# Patient Record
Sex: MALE | Race: WHITE | Employment: OTHER | URBAN - METROPOLITAN AREA
[De-identification: names, ages, dates, MRNs, and addresses within clinical notes are randomized per-mention and may not be internally consistent; named-entity substitution may affect disease eponyms.]

---

## 2017-07-11 RX ORDER — GABAPENTIN 100 MG/1
100 CAPSULE ORAL 3 TIMES DAILY
COMMUNITY

## 2017-07-19 LAB
ABSOLUTE EOS #: 0.06 K/UL (ref 0–0.4)
ABSOLUTE LYMPH #: 0.69 K/UL (ref 1–4.8)
ABSOLUTE MONO #: 0.88 K/UL (ref 0.1–1.2)
ALBUMIN SERPL-MCNC: 3.6 G/DL (ref 3.5–5.2)
ALBUMIN/GLOBULIN RATIO: 1.2 (ref 1–2.5)
ALP BLD-CCNC: 55 U/L (ref 40–129)
ALT SERPL-CCNC: 22 U/L (ref 5–41)
ANION GAP SERPL CALCULATED.3IONS-SCNC: 14 MMOL/L (ref 9–17)
AST SERPL-CCNC: 30 U/L
BASOPHILS # BLD: 0 %
BASOPHILS ABSOLUTE: 0 K/UL (ref 0–0.2)
BILIRUB SERPL-MCNC: 0.43 MG/DL (ref 0.3–1.2)
BILIRUBIN DIRECT: 0.12 MG/DL
BILIRUBIN, INDIRECT: 0.31 MG/DL (ref 0–1)
BUN BLDV-MCNC: 19 MG/DL (ref 8–23)
BUN/CREAT BLD: 15 (ref 9–20)
CALCIUM SERPL-MCNC: 10.7 MG/DL (ref 8.6–10.4)
CHLORIDE BLD-SCNC: 101 MMOL/L (ref 98–107)
CO2: 29 MMOL/L (ref 20–31)
CREAT SERPL-MCNC: 1.26 MG/DL (ref 0.7–1.2)
DIFFERENTIAL TYPE: ABNORMAL
EOSINOPHILS RELATIVE PERCENT: 1 %
GFR AFRICAN AMERICAN: >60 ML/MIN
GFR NON-AFRICAN AMERICAN: 56 ML/MIN
GFR SERPL CREATININE-BSD FRML MDRD: ABNORMAL ML/MIN/{1.73_M2}
GFR SERPL CREATININE-BSD FRML MDRD: ABNORMAL ML/MIN/{1.73_M2}
GLOBULIN: 3 G/DL (ref 1.5–3.8)
GLUCOSE BLD-MCNC: 66 MG/DL (ref 70–99)
HCT VFR BLD CALC: 34.1 % (ref 41–53)
HEMOGLOBIN: 10.9 G/DL (ref 13.5–17.5)
LYMPHOCYTES # BLD: 11 %
MAGNESIUM: 1.8 MG/DL (ref 1.6–2.6)
MCH RBC QN AUTO: 25.9 PG (ref 26–34)
MCHC RBC AUTO-ENTMCNC: 32 G/DL (ref 31–37)
MCV RBC AUTO: 81.1 FL (ref 80–100)
MONOCYTES # BLD: 14 %
MORPHOLOGY: ABNORMAL
PDW BLD-RTO: 17.1 % (ref 11–14.5)
PLATELET # BLD: 155 K/UL (ref 140–450)
PLATELET ESTIMATE: ABNORMAL
PMV BLD AUTO: 8.9 FL (ref 6–12)
POTASSIUM SERPL-SCNC: 3.9 MMOL/L (ref 3.7–5.3)
RBC # BLD: 4.21 M/UL (ref 4.5–5.9)
RBC # BLD: ABNORMAL 10*6/UL
SEG NEUTROPHILS: 74 %
SEGMENTED NEUTROPHILS ABSOLUTE COUNT: 4.67 K/UL (ref 1.8–7.7)
SODIUM BLD-SCNC: 144 MMOL/L (ref 135–144)
TOTAL PROTEIN: 6.6 G/DL (ref 6.4–8.3)
WBC # BLD: 6.3 K/UL (ref 3.5–11)
WBC # BLD: ABNORMAL 10*3/UL

## 2017-07-20 LAB
VANCOMYCIN TROUGH DATE LAST DOSE: NORMAL
VANCOMYCIN TROUGH DOSE AMOUNT: NORMAL
VANCOMYCIN TROUGH TIME LAST DOSE: 800
VANCOMYCIN TROUGH: 12.5 UG/ML (ref 10–20)

## 2017-07-21 LAB
BUN BLDV-MCNC: 23 MG/DL (ref 8–23)
CREAT SERPL-MCNC: 1.37 MG/DL (ref 0.7–1.2)
GFR AFRICAN AMERICAN: >60 ML/MIN
GFR NON-AFRICAN AMERICAN: 51 ML/MIN
GFR SERPL CREATININE-BSD FRML MDRD: ABNORMAL ML/MIN/{1.73_M2}
GFR SERPL CREATININE-BSD FRML MDRD: ABNORMAL ML/MIN/{1.73_M2}
RAPAMUNE: 10.3 NG/ML

## 2017-07-24 LAB
BUN BLDV-MCNC: 23 MG/DL (ref 8–23)
CREAT SERPL-MCNC: 1.6 MG/DL (ref 0.7–1.2)
GFR AFRICAN AMERICAN: 52 ML/MIN
GFR NON-AFRICAN AMERICAN: 43 ML/MIN
GFR SERPL CREATININE-BSD FRML MDRD: ABNORMAL ML/MIN/{1.73_M2}
GFR SERPL CREATININE-BSD FRML MDRD: ABNORMAL ML/MIN/{1.73_M2}

## 2017-07-26 ENCOUNTER — HOSPITAL ENCOUNTER (OUTPATIENT)
Age: 72
Setting detail: SPECIMEN
Discharge: HOME | End: 2017-07-26
Payer: MEDICARE

## 2017-07-26 LAB
ABSOLUTE EOS #: 0.1 K/UL (ref 0–0.4)
ABSOLUTE LYMPH #: 0.6 K/UL (ref 1–4.8)
ABSOLUTE MONO #: 0.5 K/UL (ref 0.1–1.2)
ALBUMIN SERPL-MCNC: 3.4 G/DL (ref 3.5–5.2)
ALBUMIN/GLOBULIN RATIO: 1.3 (ref 1–2.5)
ALP BLD-CCNC: 58 U/L (ref 40–129)
ALT SERPL-CCNC: 21 U/L (ref 5–41)
ANION GAP SERPL CALCULATED.3IONS-SCNC: 11 MMOL/L (ref 9–17)
AST SERPL-CCNC: 23 U/L
BASOPHILS # BLD: 1 %
BASOPHILS ABSOLUTE: 0 K/UL (ref 0–0.2)
BILIRUB SERPL-MCNC: 0.31 MG/DL (ref 0.3–1.2)
BILIRUBIN DIRECT: 0.1 MG/DL
BILIRUBIN, INDIRECT: 0.21 MG/DL (ref 0–1)
BUN BLDV-MCNC: 17 MG/DL (ref 8–23)
BUN/CREAT BLD: 12 (ref 9–20)
CALCIUM SERPL-MCNC: 8.8 MG/DL (ref 8.6–10.4)
CHLORIDE BLD-SCNC: 102 MMOL/L (ref 98–107)
CO2: 25 MMOL/L (ref 20–31)
CREAT SERPL-MCNC: 1.38 MG/DL (ref 0.7–1.2)
DIFFERENTIAL TYPE: ABNORMAL
EOSINOPHILS RELATIVE PERCENT: 2 %
GFR AFRICAN AMERICAN: >60 ML/MIN
GFR NON-AFRICAN AMERICAN: 51 ML/MIN
GFR SERPL CREATININE-BSD FRML MDRD: ABNORMAL ML/MIN/{1.73_M2}
GFR SERPL CREATININE-BSD FRML MDRD: ABNORMAL ML/MIN/{1.73_M2}
GLOBULIN: 2.7 G/DL (ref 1.5–3.8)
GLUCOSE BLD-MCNC: 205 MG/DL (ref 70–99)
HCT VFR BLD CALC: 29.3 % (ref 41–53)
HEMOGLOBIN: 9.2 G/DL (ref 13.5–17.5)
LYMPHOCYTES # BLD: 19 %
MAGNESIUM: 2 MG/DL (ref 1.6–2.6)
MCH RBC QN AUTO: 25.5 PG (ref 26–34)
MCHC RBC AUTO-ENTMCNC: 31.4 G/DL (ref 31–37)
MCV RBC AUTO: 81.3 FL (ref 80–100)
MONOCYTES # BLD: 15 %
PDW BLD-RTO: 16.9 % (ref 11–14.5)
PLATELET # BLD: 170 K/UL (ref 140–450)
PLATELET ESTIMATE: ABNORMAL
PMV BLD AUTO: 9 FL (ref 6–12)
POTASSIUM SERPL-SCNC: 4.2 MMOL/L (ref 3.7–5.3)
RBC # BLD: 3.6 M/UL (ref 4.5–5.9)
RBC # BLD: ABNORMAL 10*6/UL
SEG NEUTROPHILS: 63 %
SEGMENTED NEUTROPHILS ABSOLUTE COUNT: 2 K/UL (ref 1.8–7.7)
SODIUM BLD-SCNC: 138 MMOL/L (ref 135–144)
TOTAL PROTEIN: 6.1 G/DL (ref 6.4–8.3)
WBC # BLD: 3.2 K/UL (ref 3.5–11)
WBC # BLD: ABNORMAL 10*3/UL

## 2017-07-27 LAB
VANCOMYCIN TROUGH DATE LAST DOSE: NORMAL
VANCOMYCIN TROUGH DOSE AMOUNT: NORMAL
VANCOMYCIN TROUGH TIME LAST DOSE: 800
VANCOMYCIN TROUGH: 16.2 UG/ML (ref 10–20)

## 2017-07-28 LAB
ABSOLUTE EOS #: 0 K/UL (ref 0–0.4)
ABSOLUTE LYMPH #: 0.4 K/UL (ref 1–4.8)
ABSOLUTE MONO #: 0.2 K/UL (ref 0.1–1.2)
BASOPHILS # BLD: 1 %
BASOPHILS ABSOLUTE: 0 K/UL (ref 0–0.2)
BUN BLDV-MCNC: 17 MG/DL (ref 8–23)
CREAT SERPL-MCNC: 1.29 MG/DL (ref 0.7–1.2)
DIFFERENTIAL TYPE: ABNORMAL
EOSINOPHILS RELATIVE PERCENT: 1 %
GFR AFRICAN AMERICAN: >60 ML/MIN
GFR NON-AFRICAN AMERICAN: 55 ML/MIN
GFR SERPL CREATININE-BSD FRML MDRD: ABNORMAL ML/MIN/{1.73_M2}
GFR SERPL CREATININE-BSD FRML MDRD: ABNORMAL ML/MIN/{1.73_M2}
HCT VFR BLD CALC: 40.4 % (ref 41–53)
HEMOGLOBIN: 12.8 G/DL (ref 13.5–17.5)
LYMPHOCYTES # BLD: 14 %
MCH RBC QN AUTO: 25.6 PG (ref 26–34)
MCHC RBC AUTO-ENTMCNC: 31.6 G/DL (ref 31–37)
MCV RBC AUTO: 81.2 FL (ref 80–100)
MONOCYTES # BLD: 8 %
PDW BLD-RTO: 17.2 % (ref 11–14.5)
PLATELET # BLD: 118 K/UL (ref 140–450)
PLATELET ESTIMATE: ABNORMAL
PMV BLD AUTO: 8.4 FL (ref 6–12)
RAPAMUNE: 14.9 NG/ML
RBC # BLD: 4.98 M/UL (ref 4.5–5.9)
RBC # BLD: ABNORMAL 10*6/UL
SEG NEUTROPHILS: 76 %
SEGMENTED NEUTROPHILS ABSOLUTE COUNT: 1.9 K/UL (ref 1.8–7.7)
WBC # BLD: 2.5 K/UL (ref 3.5–11)
WBC # BLD: ABNORMAL 10*3/UL

## 2017-07-31 LAB
ABSOLUTE EOS #: 0.06 K/UL (ref 0–0.4)
ABSOLUTE LYMPH #: 0.74 K/UL (ref 1–4.8)
ABSOLUTE MONO #: 0.5 K/UL (ref 0.1–1.2)
ALBUMIN SERPL-MCNC: 3.7 G/DL (ref 3.5–5.2)
ALBUMIN/GLOBULIN RATIO: 1.2 (ref 1–2.5)
ALP BLD-CCNC: 58 U/L (ref 40–129)
ALT SERPL-CCNC: 23 U/L (ref 5–41)
ANION GAP SERPL CALCULATED.3IONS-SCNC: 13 MMOL/L (ref 9–17)
AST SERPL-CCNC: 26 U/L
BASOPHILS # BLD: 1 %
BASOPHILS ABSOLUTE: 0.03 K/UL (ref 0–0.2)
BILIRUB SERPL-MCNC: 0.29 MG/DL (ref 0.3–1.2)
BILIRUBIN DIRECT: 0.09 MG/DL
BILIRUBIN, INDIRECT: 0.2 MG/DL (ref 0–1)
BUN BLDV-MCNC: 18 MG/DL (ref 8–23)
BUN/CREAT BLD: 13 (ref 9–20)
CALCIUM SERPL-MCNC: 9.2 MG/DL (ref 8.6–10.4)
CHLORIDE BLD-SCNC: 105 MMOL/L (ref 98–107)
CO2: 26 MMOL/L (ref 20–31)
CREAT SERPL-MCNC: 1.37 MG/DL (ref 0.7–1.2)
DIFFERENTIAL TYPE: ABNORMAL
EOSINOPHILS RELATIVE PERCENT: 2 %
GFR AFRICAN AMERICAN: >60 ML/MIN
GFR NON-AFRICAN AMERICAN: 51 ML/MIN
GFR SERPL CREATININE-BSD FRML MDRD: ABNORMAL ML/MIN/{1.73_M2}
GFR SERPL CREATININE-BSD FRML MDRD: ABNORMAL ML/MIN/{1.73_M2}
GLOBULIN: 3 G/DL (ref 1.5–3.8)
GLUCOSE BLD-MCNC: 52 MG/DL (ref 70–99)
HCT VFR BLD CALC: 30.6 % (ref 41–53)
HEMOGLOBIN: 9.7 G/DL (ref 13.5–17.5)
LYMPHOCYTES # BLD: 24 %
MAGNESIUM: 2 MG/DL (ref 1.6–2.6)
MCH RBC QN AUTO: 25.6 PG (ref 26–34)
MCHC RBC AUTO-ENTMCNC: 31.7 G/DL (ref 31–37)
MCV RBC AUTO: 80.9 FL (ref 80–100)
MONOCYTES # BLD: 16 %
MORPHOLOGY: ABNORMAL
PDW BLD-RTO: 16.8 % (ref 11–14.5)
PLATELET # BLD: 182 K/UL (ref 140–450)
PLATELET ESTIMATE: ABNORMAL
PMV BLD AUTO: 8.8 FL (ref 6–12)
POTASSIUM SERPL-SCNC: 3.9 MMOL/L (ref 3.7–5.3)
RBC # BLD: 3.78 M/UL (ref 4.5–5.9)
RBC # BLD: ABNORMAL 10*6/UL
SEG NEUTROPHILS: 57 %
SEGMENTED NEUTROPHILS ABSOLUTE COUNT: 1.77 K/UL (ref 1.8–7.7)
SODIUM BLD-SCNC: 144 MMOL/L (ref 135–144)
TOTAL CK: 192 U/L (ref 39–308)
TOTAL PROTEIN: 6.7 G/DL (ref 6.4–8.3)
WBC # BLD: 3.1 K/UL (ref 3.5–11)
WBC # BLD: ABNORMAL 10*3/UL

## 2017-08-02 LAB
ABSOLUTE EOS #: 0.1 K/UL (ref 0–0.4)
ABSOLUTE LYMPH #: 0.6 K/UL (ref 1–4.8)
ABSOLUTE MONO #: 0.6 K/UL (ref 0.1–1.2)
BASOPHILS # BLD: 1 %
BASOPHILS ABSOLUTE: 0 K/UL (ref 0–0.2)
DIFFERENTIAL TYPE: ABNORMAL
EOSINOPHILS RELATIVE PERCENT: 2 %
HCT VFR BLD CALC: 29.4 % (ref 41–53)
HEMOGLOBIN: 9.4 G/DL (ref 13.5–17.5)
LYMPHOCYTES # BLD: 17 %
MCH RBC QN AUTO: 25.9 PG (ref 26–34)
MCHC RBC AUTO-ENTMCNC: 32 G/DL (ref 31–37)
MCV RBC AUTO: 80.8 FL (ref 80–100)
MONOCYTES # BLD: 16 %
PDW BLD-RTO: 17.3 % (ref 11–14.5)
PLATELET # BLD: 185 K/UL (ref 140–450)
PLATELET ESTIMATE: ABNORMAL
PMV BLD AUTO: 8.9 FL (ref 6–12)
RBC # BLD: 3.64 M/UL (ref 4.5–5.9)
RBC # BLD: ABNORMAL 10*6/UL
SEG NEUTROPHILS: 64 %
SEGMENTED NEUTROPHILS ABSOLUTE COUNT: 2.3 K/UL (ref 1.8–7.7)
WBC # BLD: 3.6 K/UL (ref 3.5–11)
WBC # BLD: ABNORMAL 10*3/UL

## 2017-08-03 LAB — RAPAMUNE: 13 NG/ML

## 2017-08-04 LAB
ABSOLUTE EOS #: 0.1 K/UL (ref 0–0.4)
ABSOLUTE LYMPH #: 0.7 K/UL (ref 1–4.8)
ABSOLUTE MONO #: 0.5 K/UL (ref 0.1–1.2)
BASOPHILS # BLD: 1 %
BASOPHILS ABSOLUTE: 0 K/UL (ref 0–0.2)
DIFFERENTIAL TYPE: ABNORMAL
EOSINOPHILS RELATIVE PERCENT: 3 %
HCT VFR BLD CALC: 29.8 % (ref 41–53)
HEMOGLOBIN: 9.3 G/DL (ref 13.5–17.5)
LYMPHOCYTES # BLD: 21 %
MCH RBC QN AUTO: 25.1 PG (ref 26–34)
MCHC RBC AUTO-ENTMCNC: 31.1 G/DL (ref 31–37)
MCV RBC AUTO: 80.9 FL (ref 80–100)
MONOCYTES # BLD: 17 %
PDW BLD-RTO: 16.9 % (ref 11–14.5)
PLATELET # BLD: 160 K/UL (ref 140–450)
PLATELET ESTIMATE: ABNORMAL
PMV BLD AUTO: 8.8 FL (ref 6–12)
RBC # BLD: 3.69 M/UL (ref 4.5–5.9)
RBC # BLD: ABNORMAL 10*6/UL
SEG NEUTROPHILS: 58 %
SEGMENTED NEUTROPHILS ABSOLUTE COUNT: 1.8 K/UL (ref 1.8–7.7)
WBC # BLD: 3.1 K/UL (ref 3.5–11)
WBC # BLD: ABNORMAL 10*3/UL

## 2017-08-07 LAB
ABSOLUTE EOS #: 0.1 K/UL (ref 0–0.4)
ABSOLUTE LYMPH #: 0.5 K/UL (ref 1–4.8)
ABSOLUTE MONO #: 0.2 K/UL (ref 0.1–1.2)
ALBUMIN SERPL-MCNC: 3.9 G/DL (ref 3.5–5.2)
ALBUMIN/GLOBULIN RATIO: 1.4 (ref 1–2.5)
ALP BLD-CCNC: 55 U/L (ref 40–129)
ALT SERPL-CCNC: 24 U/L (ref 5–41)
ANION GAP SERPL CALCULATED.3IONS-SCNC: 12 MMOL/L (ref 9–17)
AST SERPL-CCNC: 33 U/L
BASOPHILS # BLD: 0 %
BASOPHILS ABSOLUTE: 0 K/UL (ref 0–0.2)
BILIRUB SERPL-MCNC: 0.26 MG/DL (ref 0.3–1.2)
BILIRUBIN DIRECT: 0.09 MG/DL
BILIRUBIN, INDIRECT: 0.17 MG/DL (ref 0–1)
BUN BLDV-MCNC: 20 MG/DL (ref 8–23)
BUN/CREAT BLD: 13 (ref 9–20)
CALCIUM SERPL-MCNC: 9.4 MG/DL (ref 8.6–10.4)
CHLORIDE BLD-SCNC: 102 MMOL/L (ref 98–107)
CO2: 27 MMOL/L (ref 20–31)
CREAT SERPL-MCNC: 1.59 MG/DL (ref 0.7–1.2)
DIFFERENTIAL TYPE: ABNORMAL
EOSINOPHILS RELATIVE PERCENT: 4 %
GFR AFRICAN AMERICAN: 52 ML/MIN
GFR NON-AFRICAN AMERICAN: 43 ML/MIN
GFR SERPL CREATININE-BSD FRML MDRD: ABNORMAL ML/MIN/{1.73_M2}
GFR SERPL CREATININE-BSD FRML MDRD: ABNORMAL ML/MIN/{1.73_M2}
GLOBULIN: 2.8 G/DL (ref 1.5–3.8)
GLUCOSE BLD-MCNC: 72 MG/DL (ref 70–99)
HCT VFR BLD CALC: 32.5 % (ref 41–53)
HEMOGLOBIN: 10.2 G/DL (ref 13.5–17.5)
LYMPHOCYTES # BLD: 14 %
MAGNESIUM: 2 MG/DL (ref 1.6–2.6)
MCH RBC QN AUTO: 25.1 PG (ref 26–34)
MCHC RBC AUTO-ENTMCNC: 31.3 G/DL (ref 31–37)
MCV RBC AUTO: 80.2 FL (ref 80–100)
MONOCYTES # BLD: 7 %
PDW BLD-RTO: 16.9 % (ref 11–14.5)
PLATELET # BLD: 171 K/UL (ref 140–450)
PLATELET ESTIMATE: ABNORMAL
PMV BLD AUTO: 8.8 FL (ref 6–12)
POTASSIUM SERPL-SCNC: 4.7 MMOL/L (ref 3.7–5.3)
RBC # BLD: 4.05 M/UL (ref 4.5–5.9)
RBC # BLD: ABNORMAL 10*6/UL
SEG NEUTROPHILS: 75 %
SEGMENTED NEUTROPHILS ABSOLUTE COUNT: 2.8 K/UL (ref 1.8–7.7)
SODIUM BLD-SCNC: 141 MMOL/L (ref 135–144)
TOTAL CK: 331 U/L (ref 39–308)
TOTAL PROTEIN: 6.7 G/DL (ref 6.4–8.3)
WBC # BLD: 3.7 K/UL (ref 3.5–11)
WBC # BLD: ABNORMAL 10*3/UL

## 2017-08-09 LAB
-: ABNORMAL
ABSOLUTE EOS #: 0.1 K/UL (ref 0–0.4)
ABSOLUTE LYMPH #: 0.7 K/UL (ref 1–4.8)
ABSOLUTE MONO #: 0.7 K/UL (ref 0.1–1.2)
ALBUMIN SERPL-MCNC: 3.6 G/DL (ref 3.5–5.2)
ALBUMIN/GLOBULIN RATIO: 1.2 (ref 1–2.5)
ALP BLD-CCNC: 66 U/L (ref 40–129)
ALT SERPL-CCNC: 24 U/L (ref 5–41)
AMORPHOUS: ABNORMAL
ANION GAP SERPL CALCULATED.3IONS-SCNC: 13 MMOL/L (ref 9–17)
AST SERPL-CCNC: 28 U/L
BACTERIA: ABNORMAL
BASOPHILS # BLD: 1 %
BASOPHILS ABSOLUTE: 0 K/UL (ref 0–0.2)
BILIRUB SERPL-MCNC: 0.19 MG/DL (ref 0.3–1.2)
BILIRUBIN DIRECT: 0.08 MG/DL
BILIRUBIN URINE: NEGATIVE
BUN BLDV-MCNC: 21 MG/DL (ref 8–23)
BUN/CREAT BLD: 13 (ref 9–20)
CALCIUM SERPL-MCNC: 9.2 MG/DL (ref 8.6–10.4)
CASTS UA: ABNORMAL /LPF (ref 0–2)
CHLORIDE BLD-SCNC: 100 MMOL/L (ref 98–107)
CO2: 24 MMOL/L (ref 20–31)
COLOR: ABNORMAL
COMMENT UA: ABNORMAL
CREAT SERPL-MCNC: 1.56 MG/DL (ref 0.7–1.2)
CREATININE URINE: 178.8 MG/DL (ref 39–259)
CRYSTALS, UA: ABNORMAL /HPF
DIFFERENTIAL TYPE: ABNORMAL
EOSINOPHILS RELATIVE PERCENT: 1 %
EPITHELIAL CELLS UA: ABNORMAL /HPF (ref 0–5)
GFR AFRICAN AMERICAN: 53 ML/MIN
GFR NON-AFRICAN AMERICAN: 44 ML/MIN
GFR SERPL CREATININE-BSD FRML MDRD: ABNORMAL ML/MIN/{1.73_M2}
GFR SERPL CREATININE-BSD FRML MDRD: ABNORMAL ML/MIN/{1.73_M2}
GLUCOSE BLD-MCNC: 346 MG/DL (ref 70–99)
GLUCOSE URINE: ABNORMAL
HCT VFR BLD CALC: 31.4 % (ref 41–53)
HEMOGLOBIN: 9.8 G/DL (ref 13.5–17.5)
KETONES, URINE: ABNORMAL
LEUKOCYTE ESTERASE, URINE: NEGATIVE
LYMPHOCYTES # BLD: 18 %
MAGNESIUM: 2 MG/DL (ref 1.6–2.6)
MCH RBC QN AUTO: 25.4 PG (ref 26–34)
MCHC RBC AUTO-ENTMCNC: 31.3 G/DL (ref 31–37)
MCV RBC AUTO: 81.2 FL (ref 80–100)
MONOCYTES # BLD: 16 %
MUCUS: ABNORMAL
NITRITE, URINE: NEGATIVE
OTHER OBSERVATIONS UA: ABNORMAL
PDW BLD-RTO: 16.8 % (ref 11–14.5)
PH UA: 6 (ref 5–6)
PHOSPHORUS: 3.2 MG/DL (ref 2.5–4.5)
PLATELET # BLD: 176 K/UL (ref 140–450)
PLATELET ESTIMATE: ABNORMAL
PMV BLD AUTO: 9.1 FL (ref 6–12)
POTASSIUM SERPL-SCNC: 5 MMOL/L (ref 3.7–5.3)
PROTEIN UA: ABNORMAL
RBC # BLD: 3.87 M/UL (ref 4.5–5.9)
RBC # BLD: ABNORMAL 10*6/UL
RBC UA: ABNORMAL /HPF (ref 0–4)
RENAL EPITHELIAL, UA: ABNORMAL /HPF
SEG NEUTROPHILS: 64 %
SEGMENTED NEUTROPHILS ABSOLUTE COUNT: 2.7 K/UL (ref 1.8–7.7)
SODIUM BLD-SCNC: 137 MMOL/L (ref 135–144)
SPECIFIC GRAVITY UA: 1.02 (ref 1.01–1.02)
TOTAL CK: 421 U/L (ref 39–308)
TOTAL PROTEIN, URINE: 52 MG/DL
TOTAL PROTEIN: 6.5 G/DL (ref 6.4–8.3)
TRICHOMONAS: ABNORMAL
TURBIDITY: ABNORMAL
URIC ACID: 4.9 MG/DL (ref 3.4–7)
URINE HGB: NEGATIVE
UROBILINOGEN, URINE: NORMAL
WBC # BLD: 4.2 K/UL (ref 3.5–11)
WBC # BLD: ABNORMAL 10*3/UL
WBC UA: ABNORMAL /HPF (ref 0–4)
YEAST: ABNORMAL

## 2017-08-10 LAB — RAPAMUNE: 15.2 NG/ML

## 2017-08-11 LAB
ABSOLUTE EOS #: 0.1 K/UL (ref 0–0.4)
ABSOLUTE LYMPH #: 0.7 K/UL (ref 1–4.8)
ABSOLUTE MONO #: 0.6 K/UL (ref 0.1–1.2)
BASOPHILS # BLD: 1 %
BASOPHILS ABSOLUTE: 0 K/UL (ref 0–0.2)
BUN BLDV-MCNC: 15 MG/DL (ref 8–23)
CREAT SERPL-MCNC: 1.25 MG/DL (ref 0.7–1.2)
DIFFERENTIAL TYPE: ABNORMAL
EOSINOPHILS RELATIVE PERCENT: 2 %
GFR AFRICAN AMERICAN: >60 ML/MIN
GFR NON-AFRICAN AMERICAN: 57 ML/MIN
GFR SERPL CREATININE-BSD FRML MDRD: ABNORMAL ML/MIN/{1.73_M2}
GFR SERPL CREATININE-BSD FRML MDRD: ABNORMAL ML/MIN/{1.73_M2}
HCT VFR BLD CALC: 30.2 % (ref 41–53)
HEMOGLOBIN: 9.5 G/DL (ref 13.5–17.5)
LYMPHOCYTES # BLD: 20 %
MCH RBC QN AUTO: 25.2 PG (ref 26–34)
MCHC RBC AUTO-ENTMCNC: 31.4 G/DL (ref 31–37)
MCV RBC AUTO: 80.2 FL (ref 80–100)
MONOCYTES # BLD: 17 %
PDW BLD-RTO: 16.5 % (ref 11–14.5)
PLATELET # BLD: 152 K/UL (ref 140–450)
PLATELET ESTIMATE: ABNORMAL
PMV BLD AUTO: 9.1 FL (ref 6–12)
RAPAMUNE: 13.6 NG/ML
RBC # BLD: 3.77 M/UL (ref 4.5–5.9)
RBC # BLD: ABNORMAL 10*6/UL
SEG NEUTROPHILS: 60 %
SEGMENTED NEUTROPHILS ABSOLUTE COUNT: 2 K/UL (ref 1.8–7.7)
TOTAL CK: 293 U/L (ref 39–308)
WBC # BLD: 3.4 K/UL (ref 3.5–11)
WBC # BLD: ABNORMAL 10*3/UL

## 2017-08-14 ENCOUNTER — HOSPITAL ENCOUNTER (OUTPATIENT)
Age: 72
Setting detail: SPECIMEN
Discharge: HOME | End: 2017-08-14
Payer: COMMERCIAL

## 2017-08-14 LAB
ABSOLUTE EOS #: 0.1 K/UL (ref 0–0.4)
ABSOLUTE LYMPH #: 0.8 K/UL (ref 1–4.8)
ABSOLUTE MONO #: 0.6 K/UL (ref 0.1–1.2)
ALBUMIN SERPL-MCNC: 3.6 G/DL (ref 3.5–5.2)
ALBUMIN/GLOBULIN RATIO: 1.4 (ref 1–2.5)
ALP BLD-CCNC: 56 U/L (ref 40–129)
ALT SERPL-CCNC: 28 U/L (ref 5–41)
ANION GAP SERPL CALCULATED.3IONS-SCNC: 11 MMOL/L (ref 9–17)
AST SERPL-CCNC: 27 U/L
BASOPHILS # BLD: 2 %
BASOPHILS ABSOLUTE: 0.1 K/UL (ref 0–0.2)
BILIRUB SERPL-MCNC: 0.2 MG/DL (ref 0.3–1.2)
BILIRUBIN DIRECT: 0.09 MG/DL
BUN BLDV-MCNC: 18 MG/DL (ref 8–23)
BUN/CREAT BLD: 13 (ref 9–20)
CALCIUM SERPL-MCNC: 9.3 MG/DL (ref 8.6–10.4)
CHLORIDE BLD-SCNC: 102 MMOL/L (ref 98–107)
CO2: 26 MMOL/L (ref 20–31)
CREAT SERPL-MCNC: 1.4 MG/DL (ref 0.7–1.2)
DIFFERENTIAL TYPE: ABNORMAL
EOSINOPHILS RELATIVE PERCENT: 3 %
GFR AFRICAN AMERICAN: >60 ML/MIN
GFR NON-AFRICAN AMERICAN: 50 ML/MIN
GFR SERPL CREATININE-BSD FRML MDRD: ABNORMAL ML/MIN/{1.73_M2}
GFR SERPL CREATININE-BSD FRML MDRD: ABNORMAL ML/MIN/{1.73_M2}
GLUCOSE BLD-MCNC: 155 MG/DL (ref 70–99)
HCT VFR BLD CALC: 30.2 % (ref 41–53)
HEMOGLOBIN: 9.5 G/DL (ref 13.5–17.5)
LYMPHOCYTES # BLD: 26 %
MAGNESIUM: 1.9 MG/DL (ref 1.6–2.6)
MCH RBC QN AUTO: 25 PG (ref 26–34)
MCHC RBC AUTO-ENTMCNC: 31.3 G/DL (ref 31–37)
MCV RBC AUTO: 79.8 FL (ref 80–100)
MONOCYTES # BLD: 20 %
PDW BLD-RTO: 16.7 % (ref 11–14.5)
PHOSPHORUS: 3.7 MG/DL (ref 2.5–4.5)
PLATELET # BLD: 152 K/UL (ref 140–450)
PLATELET ESTIMATE: ABNORMAL
PMV BLD AUTO: 8.6 FL (ref 6–12)
POTASSIUM SERPL-SCNC: 4.8 MMOL/L (ref 3.7–5.3)
RBC # BLD: 3.79 M/UL (ref 4.5–5.9)
RBC # BLD: ABNORMAL 10*6/UL
SEG NEUTROPHILS: 49 %
SEGMENTED NEUTROPHILS ABSOLUTE COUNT: 1.6 K/UL (ref 1.8–7.7)
SODIUM BLD-SCNC: 139 MMOL/L (ref 135–144)
TOTAL CK: 240 U/L (ref 39–308)
TOTAL PROTEIN: 6.1 G/DL (ref 6.4–8.3)
URIC ACID: 4.5 MG/DL (ref 3.4–7)
WBC # BLD: 3.2 K/UL (ref 3.5–11)
WBC # BLD: ABNORMAL 10*3/UL

## 2017-08-14 PROCEDURE — 85025 COMPLETE CBC W/AUTO DIFF WBC: CPT

## 2017-08-14 PROCEDURE — 80195 ASSAY OF SIROLIMUS: CPT

## 2017-08-14 PROCEDURE — 82550 ASSAY OF CK (CPK): CPT

## 2017-08-14 PROCEDURE — 82248 BILIRUBIN DIRECT: CPT

## 2017-08-14 PROCEDURE — 36415 COLL VENOUS BLD VENIPUNCTURE: CPT

## 2017-08-14 PROCEDURE — 80053 COMPREHEN METABOLIC PANEL: CPT

## 2017-08-14 PROCEDURE — 84100 ASSAY OF PHOSPHORUS: CPT

## 2017-08-14 PROCEDURE — 84550 ASSAY OF BLOOD/URIC ACID: CPT

## 2017-08-14 PROCEDURE — 83735 ASSAY OF MAGNESIUM: CPT

## 2017-08-16 ENCOUNTER — HOSPITAL ENCOUNTER (OUTPATIENT)
Age: 72
Setting detail: SPECIMEN
Discharge: HOME | End: 2017-08-16
Payer: MEDICARE

## 2017-08-16 LAB
ABSOLUTE EOS #: 0.1 K/UL (ref 0–0.4)
ABSOLUTE LYMPH #: 0.9 K/UL (ref 1–4.8)
ABSOLUTE MONO #: 0.6 K/UL (ref 0.1–1.2)
ALBUMIN SERPL-MCNC: 3.8 G/DL (ref 3.5–5.2)
ALBUMIN/GLOBULIN RATIO: 1.4 (ref 1–2.5)
ALP BLD-CCNC: 61 U/L (ref 40–129)
ALT SERPL-CCNC: 27 U/L (ref 5–41)
AST SERPL-CCNC: 28 U/L
BASOPHILS # BLD: 1 %
BASOPHILS ABSOLUTE: 0 K/UL (ref 0–0.2)
BILIRUB SERPL-MCNC: 0.19 MG/DL (ref 0.3–1.2)
BILIRUBIN DIRECT: 0.09 MG/DL
BILIRUBIN, INDIRECT: 0.1 MG/DL (ref 0–1)
BUN BLDV-MCNC: 17 MG/DL (ref 8–23)
CREAT SERPL-MCNC: 1.35 MG/DL (ref 0.7–1.2)
DIFFERENTIAL TYPE: ABNORMAL
EOSINOPHILS RELATIVE PERCENT: 2 %
GFR AFRICAN AMERICAN: >60 ML/MIN
GFR NON-AFRICAN AMERICAN: 52 ML/MIN
GFR SERPL CREATININE-BSD FRML MDRD: ABNORMAL ML/MIN/{1.73_M2}
GFR SERPL CREATININE-BSD FRML MDRD: ABNORMAL ML/MIN/{1.73_M2}
GLOBULIN: 2.8 G/DL (ref 1.5–3.8)
HCT VFR BLD CALC: 32.5 % (ref 41–53)
HEMOGLOBIN: 10.2 G/DL (ref 13.5–17.5)
LYMPHOCYTES # BLD: 21 %
MCH RBC QN AUTO: 24.9 PG (ref 26–34)
MCHC RBC AUTO-ENTMCNC: 31.3 G/DL (ref 31–37)
MCV RBC AUTO: 79.5 FL (ref 80–100)
MONOCYTES # BLD: 14 %
PDW BLD-RTO: 16.8 % (ref 11–14.5)
PLATELET # BLD: 169 K/UL (ref 140–450)
PLATELET ESTIMATE: ABNORMAL
PMV BLD AUTO: 8.8 FL (ref 6–12)
RBC # BLD: 4.1 M/UL (ref 4.5–5.9)
RBC # BLD: ABNORMAL 10*6/UL
SEG NEUTROPHILS: 62 %
SEGMENTED NEUTROPHILS ABSOLUTE COUNT: 2.9 K/UL (ref 1.8–7.7)
TOTAL CK: 277 U/L (ref 39–308)
TOTAL PROTEIN: 6.6 G/DL (ref 6.4–8.3)
WBC # BLD: 4.5 K/UL (ref 3.5–11)
WBC # BLD: ABNORMAL 10*3/UL

## 2017-08-16 PROCEDURE — 81001 URINALYSIS AUTO W/SCOPE: CPT

## 2017-08-16 PROCEDURE — 85025 COMPLETE CBC W/AUTO DIFF WBC: CPT

## 2017-08-16 PROCEDURE — 82565 ASSAY OF CREATININE: CPT

## 2017-08-16 PROCEDURE — 80076 HEPATIC FUNCTION PANEL: CPT

## 2017-08-16 PROCEDURE — 84520 ASSAY OF UREA NITROGEN: CPT

## 2017-08-16 PROCEDURE — 36415 COLL VENOUS BLD VENIPUNCTURE: CPT

## 2017-08-16 PROCEDURE — 82550 ASSAY OF CK (CPK): CPT

## 2017-08-17 ENCOUNTER — HOSPITAL ENCOUNTER (OUTPATIENT)
Age: 72
Discharge: HOME | End: 2017-08-17
Payer: MEDICARE

## 2017-08-17 LAB
-: ABNORMAL
AMORPHOUS: ABNORMAL
BACTERIA: ABNORMAL
BILIRUBIN URINE: NEGATIVE
CASTS UA: ABNORMAL /LPF (ref 0–2)
CHOLESTEROL, FASTING: 195 MG/DL
CHOLESTEROL/HDL RATIO: 4.1
COLOR: ABNORMAL
COMMENT UA: ABNORMAL
CRYSTALS, UA: ABNORMAL /HPF
EPITHELIAL CELLS UA: ABNORMAL /HPF (ref 0–5)
FERRITIN: 873 UG/L (ref 30–400)
GLUCOSE URINE: ABNORMAL
HDLC SERPL-MCNC: 47 MG/DL
IRON SATURATION: 20 % (ref 20–55)
IRON: 51 UG/DL (ref 59–158)
KETONES, URINE: NEGATIVE
LDL CHOLESTEROL: 113 MG/DL (ref 0–130)
LEUKOCYTE ESTERASE, URINE: NEGATIVE
MUCUS: ABNORMAL
NITRITE, URINE: NEGATIVE
OTHER OBSERVATIONS UA: ABNORMAL
PH UA: 7 (ref 5–6)
PROTEIN UA: ABNORMAL
RBC UA: ABNORMAL /HPF (ref 0–4)
RENAL EPITHELIAL, UA: ABNORMAL /HPF
SPECIFIC GRAVITY UA: 1.02 (ref 1.01–1.02)
TOTAL IRON BINDING CAPACITY: 252 UG/DL (ref 250–450)
TRICHOMONAS: ABNORMAL
TRIGLYCERIDE, FASTING: 173 MG/DL
TURBIDITY: ABNORMAL
UNSATURATED IRON BINDING CAPACITY: 201 UG/DL (ref 112–347)
URINE HGB: NEGATIVE
UROBILINOGEN, URINE: NORMAL
VLDLC SERPL CALC-MCNC: ABNORMAL MG/DL (ref 1–30)
WBC UA: ABNORMAL /HPF (ref 0–4)
YEAST: ABNORMAL

## 2017-08-17 PROCEDURE — 83550 IRON BINDING TEST: CPT

## 2017-08-17 PROCEDURE — 80061 LIPID PANEL: CPT

## 2017-08-17 PROCEDURE — 36415 COLL VENOUS BLD VENIPUNCTURE: CPT

## 2017-08-17 PROCEDURE — 82728 ASSAY OF FERRITIN: CPT

## 2017-08-17 PROCEDURE — 83540 ASSAY OF IRON: CPT

## 2017-08-18 ENCOUNTER — HOSPITAL ENCOUNTER (OUTPATIENT)
Age: 72
Setting detail: SPECIMEN
Discharge: HOME | End: 2017-08-18
Payer: COMMERCIAL

## 2017-08-18 LAB
ABSOLUTE EOS #: 0.1 K/UL (ref 0–0.4)
ABSOLUTE LYMPH #: 0.9 K/UL (ref 1–4.8)
ABSOLUTE MONO #: 0.7 K/UL (ref 0.1–1.2)
BASOPHILS # BLD: 1 %
BASOPHILS ABSOLUTE: 0 K/UL (ref 0–0.2)
DIFFERENTIAL TYPE: ABNORMAL
EOSINOPHILS RELATIVE PERCENT: 3 %
HCT VFR BLD CALC: 33 % (ref 41–53)
HEMOGLOBIN: 10.6 G/DL (ref 13.5–17.5)
LYMPHOCYTES # BLD: 16 %
MCH RBC QN AUTO: 25.6 PG (ref 26–34)
MCHC RBC AUTO-ENTMCNC: 31.9 G/DL (ref 31–37)
MCV RBC AUTO: 80.1 FL (ref 80–100)
MONOCYTES # BLD: 14 %
PDW BLD-RTO: 17 % (ref 11–14.5)
PLATELET # BLD: 198 K/UL (ref 140–450)
PLATELET ESTIMATE: ABNORMAL
PMV BLD AUTO: 9.2 FL (ref 6–12)
RAPAMUNE: 11.5 NG/ML
RBC # BLD: 4.13 M/UL (ref 4.5–5.9)
RBC # BLD: ABNORMAL 10*6/UL
SEG NEUTROPHILS: 66 %
SEGMENTED NEUTROPHILS ABSOLUTE COUNT: 3.7 K/UL (ref 1.8–7.7)
WBC # BLD: 5.5 K/UL (ref 3.5–11)
WBC # BLD: ABNORMAL 10*3/UL

## 2017-08-18 PROCEDURE — 85025 COMPLETE CBC W/AUTO DIFF WBC: CPT

## 2017-08-18 PROCEDURE — 36415 COLL VENOUS BLD VENIPUNCTURE: CPT

## 2017-08-19 ENCOUNTER — HOSPITAL ENCOUNTER (OUTPATIENT)
Age: 72
Setting detail: SPECIMEN
Discharge: HOME | End: 2017-08-19
Payer: COMMERCIAL

## 2017-08-19 LAB
-: ABNORMAL
AMORPHOUS: ABNORMAL
BACTERIA: ABNORMAL
BILIRUBIN URINE: NEGATIVE
CASTS UA: ABNORMAL /LPF (ref 0–2)
COLOR: ABNORMAL
COMMENT UA: ABNORMAL
CRYSTALS, UA: ABNORMAL /HPF
EPITHELIAL CELLS UA: ABNORMAL /HPF (ref 0–5)
GLUCOSE URINE: ABNORMAL
KETONES, URINE: NEGATIVE
LEUKOCYTE ESTERASE, URINE: NEGATIVE
MUCUS: ABNORMAL
NITRITE, URINE: NEGATIVE
OTHER OBSERVATIONS UA: ABNORMAL
PH UA: 6.5 (ref 5–6)
PROTEIN UA: ABNORMAL
RBC UA: ABNORMAL /HPF (ref 0–4)
RENAL EPITHELIAL, UA: ABNORMAL /HPF
SPECIFIC GRAVITY UA: 1.02 (ref 1.01–1.02)
TRICHOMONAS: ABNORMAL
TURBIDITY: ABNORMAL
URINE HGB: NEGATIVE
UROBILINOGEN, URINE: NORMAL
WBC UA: ABNORMAL /HPF (ref 0–4)
YEAST: ABNORMAL

## 2017-08-19 PROCEDURE — 81001 URINALYSIS AUTO W/SCOPE: CPT

## 2017-08-21 ENCOUNTER — HOSPITAL ENCOUNTER (OUTPATIENT)
Age: 72
Setting detail: SPECIMEN
Discharge: HOME | End: 2017-08-21
Payer: MEDICARE

## 2017-08-21 LAB
ABSOLUTE EOS #: 0.15 K/UL (ref 0–0.4)
ABSOLUTE LYMPH #: 0.68 K/UL (ref 1–4.8)
ABSOLUTE MONO #: 0.61 K/UL (ref 0.1–1.2)
ALBUMIN SERPL-MCNC: 3.7 G/DL (ref 3.5–5.2)
ALBUMIN/GLOBULIN RATIO: 1.3 (ref 1–2.5)
ALP BLD-CCNC: 54 U/L (ref 40–129)
ALT SERPL-CCNC: 26 U/L (ref 5–41)
ANION GAP SERPL CALCULATED.3IONS-SCNC: 10 MMOL/L (ref 9–17)
AST SERPL-CCNC: 32 U/L
BASOPHILS # BLD: 0 %
BASOPHILS ABSOLUTE: 0 K/UL (ref 0–0.2)
BILIRUB SERPL-MCNC: 0.23 MG/DL (ref 0.3–1.2)
BILIRUBIN DIRECT: <0.08 MG/DL
BILIRUBIN, INDIRECT: ABNORMAL MG/DL (ref 0–1)
BUN BLDV-MCNC: 16 MG/DL (ref 8–23)
BUN/CREAT BLD: 13 (ref 9–20)
CALCIUM SERPL-MCNC: 9.3 MG/DL (ref 8.6–10.4)
CHLORIDE BLD-SCNC: 102 MMOL/L (ref 98–107)
CO2: 27 MMOL/L (ref 20–31)
CREAT SERPL-MCNC: 1.25 MG/DL (ref 0.7–1.2)
DIFFERENTIAL TYPE: ABNORMAL
EOSINOPHILS RELATIVE PERCENT: 4 %
GFR AFRICAN AMERICAN: >60 ML/MIN
GFR NON-AFRICAN AMERICAN: 57 ML/MIN
GFR SERPL CREATININE-BSD FRML MDRD: ABNORMAL ML/MIN/{1.73_M2}
GFR SERPL CREATININE-BSD FRML MDRD: ABNORMAL ML/MIN/{1.73_M2}
GLOBULIN: 2.8 G/DL (ref 1.5–3.8)
GLUCOSE BLD-MCNC: 172 MG/DL (ref 70–99)
HCT VFR BLD CALC: 31.6 % (ref 41–53)
HEMOGLOBIN: 10 G/DL (ref 13.5–17.5)
LYMPHOCYTES # BLD: 18 %
MAGNESIUM: 1.9 MG/DL (ref 1.6–2.6)
MCH RBC QN AUTO: 25.2 PG (ref 26–34)
MCHC RBC AUTO-ENTMCNC: 31.7 G/DL (ref 31–37)
MCV RBC AUTO: 79.4 FL (ref 80–100)
MONOCYTES # BLD: 16 %
MORPHOLOGY: ABNORMAL
PDW BLD-RTO: 17.1 % (ref 11–14.5)
PLATELET # BLD: 155 K/UL (ref 140–450)
PLATELET ESTIMATE: ABNORMAL
PMV BLD AUTO: 8.7 FL (ref 6–12)
POTASSIUM SERPL-SCNC: 4.6 MMOL/L (ref 3.7–5.3)
RBC # BLD: 3.98 M/UL (ref 4.5–5.9)
RBC # BLD: ABNORMAL 10*6/UL
SEG NEUTROPHILS: 62 %
SEGMENTED NEUTROPHILS ABSOLUTE COUNT: 2.36 K/UL (ref 1.8–7.7)
SODIUM BLD-SCNC: 139 MMOL/L (ref 135–144)
TOTAL CK: 259 U/L (ref 39–308)
TOTAL PROTEIN: 6.5 G/DL (ref 6.4–8.3)
WBC # BLD: 3.8 K/UL (ref 3.5–11)
WBC # BLD: ABNORMAL 10*3/UL

## 2017-08-21 PROCEDURE — 83735 ASSAY OF MAGNESIUM: CPT

## 2017-08-21 PROCEDURE — 80048 BASIC METABOLIC PNL TOTAL CA: CPT

## 2017-08-21 PROCEDURE — 80195 ASSAY OF SIROLIMUS: CPT

## 2017-08-21 PROCEDURE — 82550 ASSAY OF CK (CPK): CPT

## 2017-08-21 PROCEDURE — 36415 COLL VENOUS BLD VENIPUNCTURE: CPT

## 2017-08-21 PROCEDURE — 80076 HEPATIC FUNCTION PANEL: CPT

## 2017-08-21 PROCEDURE — 85025 COMPLETE CBC W/AUTO DIFF WBC: CPT

## 2017-08-22 LAB — RAPAMUNE: 14.6 NG/ML

## 2017-08-23 ENCOUNTER — HOSPITAL ENCOUNTER (OUTPATIENT)
Age: 72
Setting detail: SPECIMEN
Discharge: HOME | End: 2017-08-23
Payer: COMMERCIAL

## 2017-08-23 LAB
ABSOLUTE EOS #: 0.1 K/UL (ref 0–0.4)
ABSOLUTE LYMPH #: 0.8 K/UL (ref 1–4.8)
ABSOLUTE MONO #: 0.7 K/UL (ref 0.1–1.2)
BASOPHILS # BLD: 1 %
BASOPHILS ABSOLUTE: 0 K/UL (ref 0–0.2)
DIFFERENTIAL TYPE: ABNORMAL
EOSINOPHILS RELATIVE PERCENT: 2 %
HCT VFR BLD CALC: 31.9 % (ref 41–53)
HEMOGLOBIN: 10.1 G/DL (ref 13.5–17.5)
LYMPHOCYTES # BLD: 17 %
MCH RBC QN AUTO: 25.1 PG (ref 26–34)
MCHC RBC AUTO-ENTMCNC: 31.5 G/DL (ref 31–37)
MCV RBC AUTO: 79.6 FL (ref 80–100)
MONOCYTES # BLD: 15 %
PDW BLD-RTO: 16.9 % (ref 11–14.5)
PLATELET # BLD: 155 K/UL (ref 140–450)
PLATELET ESTIMATE: ABNORMAL
PMV BLD AUTO: 8.6 FL (ref 6–12)
RBC # BLD: 4.01 M/UL (ref 4.5–5.9)
RBC # BLD: ABNORMAL 10*6/UL
SEG NEUTROPHILS: 65 %
SEGMENTED NEUTROPHILS ABSOLUTE COUNT: 3 K/UL (ref 1.8–7.7)
WBC # BLD: 4.5 K/UL (ref 3.5–11)
WBC # BLD: ABNORMAL 10*3/UL

## 2017-08-23 PROCEDURE — 85025 COMPLETE CBC W/AUTO DIFF WBC: CPT

## 2017-08-23 PROCEDURE — 36415 COLL VENOUS BLD VENIPUNCTURE: CPT

## 2017-08-25 ENCOUNTER — HOSPITAL ENCOUNTER (OUTPATIENT)
Age: 72
Setting detail: SPECIMEN
Discharge: HOME | End: 2017-08-25
Payer: MEDICARE

## 2017-08-25 LAB
ABSOLUTE EOS #: 0.1 K/UL (ref 0–0.4)
ABSOLUTE LYMPH #: 0.8 K/UL (ref 1–4.8)
ABSOLUTE MONO #: 0.7 K/UL (ref 0.1–1.2)
BASOPHILS # BLD: 2 %
BASOPHILS ABSOLUTE: 0.1 K/UL (ref 0–0.2)
DIFFERENTIAL TYPE: ABNORMAL
EOSINOPHILS RELATIVE PERCENT: 2 %
HCT VFR BLD CALC: 30.8 % (ref 41–53)
HEMOGLOBIN: 9.8 G/DL (ref 13.5–17.5)
LYMPHOCYTES # BLD: 21 %
MCH RBC QN AUTO: 25 PG (ref 26–34)
MCHC RBC AUTO-ENTMCNC: 31.7 G/DL (ref 31–37)
MCV RBC AUTO: 79 FL (ref 80–100)
MONOCYTES # BLD: 18 %
PDW BLD-RTO: 17.2 % (ref 11–14.5)
PLATELET # BLD: 147 K/UL (ref 140–450)
PLATELET ESTIMATE: ABNORMAL
PMV BLD AUTO: 8.7 FL (ref 6–12)
RBC # BLD: 3.9 M/UL (ref 4.5–5.9)
RBC # BLD: ABNORMAL 10*6/UL
SEG NEUTROPHILS: 57 %
SEGMENTED NEUTROPHILS ABSOLUTE COUNT: 2.1 K/UL (ref 1.8–7.7)
WBC # BLD: 3.6 K/UL (ref 3.5–11)
WBC # BLD: ABNORMAL 10*3/UL

## 2017-08-25 PROCEDURE — 36415 COLL VENOUS BLD VENIPUNCTURE: CPT

## 2017-08-25 PROCEDURE — 85025 COMPLETE CBC W/AUTO DIFF WBC: CPT

## 2017-08-25 PROCEDURE — 80195 ASSAY OF SIROLIMUS: CPT

## 2017-08-27 LAB — RAPAMUNE: 12.9 NG/ML

## 2017-08-28 ENCOUNTER — HOSPITAL ENCOUNTER (OUTPATIENT)
Age: 72
Setting detail: SPECIMEN
Discharge: HOME | End: 2017-08-28
Payer: MEDICARE

## 2017-08-28 LAB
ABSOLUTE EOS #: 0.1 K/UL (ref 0–0.4)
ABSOLUTE LYMPH #: 0.7 K/UL (ref 1–4.8)
ABSOLUTE MONO #: 0.7 K/UL (ref 0.1–1.2)
ALBUMIN SERPL-MCNC: 3.6 G/DL (ref 3.5–5.2)
ALBUMIN/GLOBULIN RATIO: 1.3 (ref 1–2.5)
ALP BLD-CCNC: 47 U/L (ref 40–129)
ALT SERPL-CCNC: 23 U/L (ref 5–41)
ANION GAP SERPL CALCULATED.3IONS-SCNC: 14 MMOL/L (ref 9–17)
AST SERPL-CCNC: 27 U/L
BASOPHILS # BLD: 1 %
BASOPHILS ABSOLUTE: 0 K/UL (ref 0–0.2)
BILIRUB SERPL-MCNC: 0.27 MG/DL (ref 0.3–1.2)
BILIRUBIN DIRECT: 0.11 MG/DL
BILIRUBIN, INDIRECT: 0.16 MG/DL (ref 0–1)
BUN BLDV-MCNC: 19 MG/DL (ref 8–23)
BUN/CREAT BLD: 15 (ref 9–20)
CALCIUM SERPL-MCNC: 9.2 MG/DL (ref 8.6–10.4)
CHLORIDE BLD-SCNC: 100 MMOL/L (ref 98–107)
CO2: 24 MMOL/L (ref 20–31)
CREAT SERPL-MCNC: 1.23 MG/DL (ref 0.7–1.2)
DIFFERENTIAL TYPE: ABNORMAL
EOSINOPHILS RELATIVE PERCENT: 1 %
GFR AFRICAN AMERICAN: >60 ML/MIN
GFR NON-AFRICAN AMERICAN: 58 ML/MIN
GFR SERPL CREATININE-BSD FRML MDRD: ABNORMAL ML/MIN/{1.73_M2}
GFR SERPL CREATININE-BSD FRML MDRD: ABNORMAL ML/MIN/{1.73_M2}
GLOBULIN: 2.7 G/DL (ref 1.5–3.8)
GLUCOSE BLD-MCNC: 198 MG/DL (ref 70–99)
HCT VFR BLD CALC: 30.2 % (ref 41–53)
HEMOGLOBIN: 9.6 G/DL (ref 13.5–17.5)
LYMPHOCYTES # BLD: 17 %
MAGNESIUM: 1.9 MG/DL (ref 1.6–2.6)
MCH RBC QN AUTO: 25.2 PG (ref 26–34)
MCHC RBC AUTO-ENTMCNC: 31.9 G/DL (ref 31–37)
MCV RBC AUTO: 78.9 FL (ref 80–100)
MONOCYTES # BLD: 17 %
PDW BLD-RTO: 17 % (ref 11–14.5)
PLATELET # BLD: 149 K/UL (ref 140–450)
PLATELET ESTIMATE: ABNORMAL
PMV BLD AUTO: 8.8 FL (ref 6–12)
POTASSIUM SERPL-SCNC: 4.8 MMOL/L (ref 3.7–5.3)
RBC # BLD: 3.82 M/UL (ref 4.5–5.9)
RBC # BLD: ABNORMAL 10*6/UL
SEG NEUTROPHILS: 64 %
SEGMENTED NEUTROPHILS ABSOLUTE COUNT: 2.7 K/UL (ref 1.8–7.7)
SODIUM BLD-SCNC: 138 MMOL/L (ref 135–144)
TOTAL CK: 221 U/L (ref 39–308)
TOTAL PROTEIN: 6.3 G/DL (ref 6.4–8.3)
WBC # BLD: 4.2 K/UL (ref 3.5–11)
WBC # BLD: ABNORMAL 10*3/UL

## 2017-08-28 PROCEDURE — 80048 BASIC METABOLIC PNL TOTAL CA: CPT

## 2017-08-28 PROCEDURE — 83735 ASSAY OF MAGNESIUM: CPT

## 2017-08-28 PROCEDURE — 82550 ASSAY OF CK (CPK): CPT

## 2017-08-28 PROCEDURE — 36415 COLL VENOUS BLD VENIPUNCTURE: CPT

## 2017-08-28 PROCEDURE — 80195 ASSAY OF SIROLIMUS: CPT

## 2017-08-28 PROCEDURE — 80076 HEPATIC FUNCTION PANEL: CPT

## 2017-08-28 PROCEDURE — 85025 COMPLETE CBC W/AUTO DIFF WBC: CPT

## 2017-08-30 ENCOUNTER — HOSPITAL ENCOUNTER (OUTPATIENT)
Age: 72
Setting detail: SPECIMEN
Discharge: HOME | End: 2017-08-30
Payer: MEDICARE

## 2017-08-30 LAB
ABSOLUTE EOS #: 0.1 K/UL (ref 0–0.4)
ABSOLUTE LYMPH #: 0.7 K/UL (ref 1–4.8)
ABSOLUTE MONO #: 0.6 K/UL (ref 0.1–1.2)
BASOPHILS # BLD: 1 %
BASOPHILS ABSOLUTE: 0 K/UL (ref 0–0.2)
DIFFERENTIAL TYPE: ABNORMAL
EOSINOPHILS RELATIVE PERCENT: 2 %
HCT VFR BLD CALC: 30.8 % (ref 41–53)
HEMOGLOBIN: 9.7 G/DL (ref 13.5–17.5)
LYMPHOCYTES # BLD: 20 %
MCH RBC QN AUTO: 24.8 PG (ref 26–34)
MCHC RBC AUTO-ENTMCNC: 31.5 G/DL (ref 31–37)
MCV RBC AUTO: 78.8 FL (ref 80–100)
MONOCYTES # BLD: 19 %
PDW BLD-RTO: 17.2 % (ref 11–14.5)
PLATELET # BLD: 153 K/UL (ref 140–450)
PLATELET ESTIMATE: ABNORMAL
PMV BLD AUTO: 8.7 FL (ref 6–12)
RAPAMUNE: 8 NG/ML
RBC # BLD: 3.91 M/UL (ref 4.5–5.9)
RBC # BLD: ABNORMAL 10*6/UL
SEG NEUTROPHILS: 58 %
SEGMENTED NEUTROPHILS ABSOLUTE COUNT: 2 K/UL (ref 1.8–7.7)
WBC # BLD: 3.4 K/UL (ref 3.5–11)
WBC # BLD: ABNORMAL 10*3/UL

## 2017-08-30 PROCEDURE — 36415 COLL VENOUS BLD VENIPUNCTURE: CPT

## 2017-08-30 PROCEDURE — 85025 COMPLETE CBC W/AUTO DIFF WBC: CPT

## 2017-10-30 ENCOUNTER — OFFICE VISIT (OUTPATIENT)
Dept: UROLOGY | Age: 72
End: 2017-10-30
Payer: MEDICARE

## 2017-10-30 ENCOUNTER — HOSPITAL ENCOUNTER (OUTPATIENT)
Age: 72
Setting detail: SPECIMEN
Discharge: HOME | End: 2017-10-30
Payer: MEDICARE

## 2017-10-30 VITALS
SYSTOLIC BLOOD PRESSURE: 130 MMHG | BODY MASS INDEX: 36.96 KG/M2 | HEART RATE: 88 BPM | WEIGHT: 272.9 LBS | DIASTOLIC BLOOD PRESSURE: 68 MMHG | HEIGHT: 72 IN

## 2017-10-30 DIAGNOSIS — N40.1 BENIGN PROSTATIC HYPERPLASIA WITH LOWER URINARY TRACT SYMPTOMS, SYMPTOM DETAILS UNSPECIFIED: Primary | ICD-10-CM

## 2017-10-30 DIAGNOSIS — N40.1 BENIGN PROSTATIC HYPERPLASIA WITH LOWER URINARY TRACT SYMPTOMS, SYMPTOM DETAILS UNSPECIFIED: ICD-10-CM

## 2017-10-30 DIAGNOSIS — N18.6 ESRD (END STAGE RENAL DISEASE) (HCC): ICD-10-CM

## 2017-10-30 LAB
-: ABNORMAL
AMORPHOUS: ABNORMAL
BACTERIA: ABNORMAL
BILIRUBIN URINE: NEGATIVE
CASTS UA: ABNORMAL /LPF (ref 0–2)
COLOR: ABNORMAL
COMMENT UA: ABNORMAL
CRYSTALS, UA: ABNORMAL /HPF
EPITHELIAL CELLS UA: ABNORMAL /HPF (ref 0–5)
GLUCOSE URINE: NEGATIVE
KETONES, URINE: NEGATIVE
LEUKOCYTE ESTERASE, URINE: ABNORMAL
MUCUS: ABNORMAL
NITRITE, URINE: NEGATIVE
OTHER OBSERVATIONS UA: ABNORMAL
PH UA: 6 (ref 5–6)
POST VOID RESIDUAL (PVR): NORMAL ML
PROTEIN UA: ABNORMAL
RBC UA: ABNORMAL /HPF (ref 0–4)
RENAL EPITHELIAL, UA: ABNORMAL /HPF
SPECIFIC GRAVITY UA: 1.01 (ref 1.01–1.02)
TRICHOMONAS: ABNORMAL
TURBIDITY: ABNORMAL
URINE HGB: ABNORMAL
UROBILINOGEN, URINE: NORMAL
WBC UA: >100 /HPF (ref 0–4)
YEAST: ABNORMAL

## 2017-10-30 PROCEDURE — 81001 URINALYSIS AUTO W/SCOPE: CPT

## 2017-10-30 PROCEDURE — G8417 CALC BMI ABV UP PARAM F/U: HCPCS | Performed by: UROLOGY

## 2017-10-30 PROCEDURE — 1036F TOBACCO NON-USER: CPT | Performed by: UROLOGY

## 2017-10-30 PROCEDURE — 99204 OFFICE O/P NEW MOD 45 MIN: CPT | Performed by: UROLOGY

## 2017-10-30 PROCEDURE — G8484 FLU IMMUNIZE NO ADMIN: HCPCS | Performed by: UROLOGY

## 2017-10-30 PROCEDURE — 87184 SC STD DISK METHOD PER PLATE: CPT

## 2017-10-30 PROCEDURE — 4040F PNEUMOC VAC/ADMIN/RCVD: CPT | Performed by: UROLOGY

## 2017-10-30 PROCEDURE — 87186 SC STD MICRODIL/AGAR DIL: CPT

## 2017-10-30 PROCEDURE — 51798 US URINE CAPACITY MEASURE: CPT | Performed by: UROLOGY

## 2017-10-30 PROCEDURE — 3017F COLORECTAL CA SCREEN DOC REV: CPT | Performed by: UROLOGY

## 2017-10-30 PROCEDURE — 87086 URINE CULTURE/COLONY COUNT: CPT

## 2017-10-30 PROCEDURE — 87077 CULTURE AEROBIC IDENTIFY: CPT

## 2017-10-30 PROCEDURE — G8427 DOCREV CUR MEDS BY ELIG CLIN: HCPCS | Performed by: UROLOGY

## 2017-10-30 PROCEDURE — 1123F ACP DISCUSS/DSCN MKR DOCD: CPT | Performed by: UROLOGY

## 2017-10-30 RX ORDER — TAMSULOSIN HYDROCHLORIDE 0.4 MG/1
0.4 CAPSULE ORAL DAILY
COMMUNITY

## 2017-10-30 RX ORDER — DOXYCYCLINE HYCLATE 100 MG/1
100 CAPSULE ORAL 2 TIMES DAILY
COMMUNITY

## 2017-10-30 NOTE — PATIENT INSTRUCTIONS
Patient Education        Cystoscopy: Care Instructions  Your Care Instructions  Cystoscopy is a test. It uses a thin, lighted tube called a cystoscope to see the inside of the bladder and the urethra. The urethra is the tube that carries urine out of the body. This test is helpful because it lets your doctor see areas of your bladder and urethra that are hard to see on X-rays. It can help your doctor find bladder stones, tumors, bleeding, and infection. During this test, your doctor also can take tissue and urine samples. And if your doctor finds small stones or growths, he or she can remove them. In most cases the scope is in the bladder for less than 10 minutes. But the entire test may take 45 minutes or longer. You will probably get local anesthesia. This numbs a small part of your body. Or you may get spinal anesthesia, which numbs more of your body. Once in a while, doctors use general anesthesia. It makes you sleep during surgery. If you get a local anesthetic, you may be able to get up right after the test. But if you had spinal or general anesthesia, you will stay in the recovery room until you are able to walk or you have feeling again in your lower body. This usually takes about an hour. Your doctor may be able to tell you some of the results right after the test. But the complete results may take several days. Follow-up care is a key part of your treatment and safety. Be sure to make and go to all appointments, and call your doctor if you are having problems. It's also a good idea to know your test results and keep a list of the medicines you take. How can you care for yourself at home? Before the test  · If you are having a local anesthetic, you can eat and drink before the test.  · If you are having a spinal or general anesthetic, do not eat or drink anything for at least 8 hours before the test. Tell your doctor what medicines you take.   · If you are not staying overnight in the hospital, make

## 2017-10-30 NOTE — PROGRESS NOTES
[facility] Consultation / New Patient Visit    Patient:  Lori Schmidt  YOB: 1945  Date: 10/30/2017  Consult requested from Dr. Madiha Rossi for purpose of evaluation of lower urinary tract symptoms. HISTORY OF PRESENT ILLNESS:   The patient is a 67 y.o. male who presents today for evaluation of the following problem(s): LUTS  Overall the problem(s) : are worsening. Associated Symptoms: No dysuria, no gross hematuria. Pain Severity:   0    New pt on Flomax, proscar. Worsening LUTS. Nocturia 7x, stream isn't strong. Problems for years. dont feel like he can empty. Has had UTI in past, not recurrent problem. No blood in urine. No kidney stones. ESRD -- secondary to diabetes    Kidney transplant years ago at [facility]. Kidney functioning well, making good urine. Heart transplant at [facility] cardiologist--dr rosy boswell. Summary of old records: reviewed Dr. Lisandra Yang records. Pertinent findings in note. (Patient's old records, notes and chart reviewed and summarized above.)    Last several PSA's:  No results found for: PSA    Last total testosterone:  No results found for: TESTOSTERONE    Urinalysis today:  Results for POC orders placed in visit on 10/30/17   poct post void residual   Result Value Ref Range    post void residual  ml    Narrative    Post void residual by bladder scanner 0cc.            Last BUN and creatinine:  Lab Results   Component Value Date    BUN 19 08/28/2017     Lab Results   Component Value Date    CREATININE 1.23 (H) 08/28/2017       Imaging Reviewed during this Office Visit:   No pertinent imaging      PAST MEDICAL, FAMILY AND SOCIAL HISTORY:  Past Medical History:   Diagnosis Date    [facility] [address]     skin    Chronic kidney disease     Diabetes mellitus ([facility] [address]     Hyperlipidemia     Hypertension     Thyroid disease     Type 2 diabetes mellitus without complication ([facility] [address]      Past Surgical History:   Procedure Inject  into the skin See Admin Instructions. Indications: sliding scale      insulin NPH (HUMULIN N;NOVOLIN N) 100 UNIT/ML injection vial Inject  into the skin 2 times daily (before meals). Indications: 60units in the am and 50units in the pm         Vitamin k and related and Benadryl [diphenhydramine]  History   Smoking Status    Never Smoker   Smokeless Tobacco    Never Used       History   Alcohol use Not on file       REVIEW OF SYSTEMS:  Constitutional: negative  Eyes: negative  Respiratory: sleep apnea  Cardiovascular: negative  Gastrointestinal: negative  Musculoskeletal: uses walker. Some gait issues. Genitourinary: see HPI  Skin: hx of skin cancer  Neurological: negative  Hematological/Lymphatic: negative  Psychological: negative    Physical Exam:    This a 67 y.o. male   Vitals:    10/30/17 1401   BP: 130/68   Pulse: 88     Constitutional: Patient in no acute distress; Neuro: alert and oriented to person place and time. Psych: Mood and affect normal.  Skin: Normal  Lungs: Respiratory effort normal  Cardiovascular:  Normal peripheral pulses  Abdomen: Soft, non-tender, non-distended with no CVA, flank pain, hepatosplenomegaly or hernia. Kidneys normal. Transplant scars and midline laparatomy scar  Bladder non-tender and not distended. Lymphatics: no palpable lymphadenopathy  Penis normal and uncircumcised. Penis buried. Urethral meatus normal  Scrotal exam normal  Testicles normal bilaterally  Epididymis normal bilaterally  No evidence of inguinal hernia  Anus and perineum normal  Normal rectal tone with no masses  Prostate: prostate smooth and symmetric without tenderness or nodules, normal in size  Seminal vesicles not palpable. Assessment and Plan      1. Benign prostatic hyperplasia with lower urinary tract symptoms, symptom details unspecified    2. ESRD (end stage renal disease) ([facility] [address]           Plan:      Return in about 2 weeks (around 11/13/2017).    Continue flomax and proscar  Cystoscopy, uroflow in two weeks. Pt unhappy with urination symptoms even on combination meds. PVR 0 in office. Needs clearance from cardiologist prior to any definitive outlet surgery.        Sanjuana Scheuermann MD.

## 2017-11-03 LAB
CULTURE: ABNORMAL
CULTURE: ABNORMAL
Lab: ABNORMAL
ORGANISM: ABNORMAL
SPECIMEN DESCRIPTION: ABNORMAL
SPECIMEN DESCRIPTION: ABNORMAL
STATUS: ABNORMAL

## 2017-11-15 ENCOUNTER — HOSPITAL ENCOUNTER (OUTPATIENT)
Age: 72
Setting detail: SPECIMEN
Discharge: HOME | End: 2017-11-15
Payer: MEDICARE

## 2017-11-15 LAB
ABSOLUTE EOS #: 0 K/UL (ref 0–0.4)
ABSOLUTE IMMATURE GRANULOCYTE: ABNORMAL K/UL (ref 0–0.3)
ABSOLUTE LYMPH #: 0.6 K/UL (ref 1–4.8)
ABSOLUTE MONO #: 0.7 K/UL (ref 0.1–1.2)
ALBUMIN SERPL-MCNC: 3.2 G/DL (ref 3.5–5.2)
ANION GAP SERPL CALCULATED.3IONS-SCNC: 12 MMOL/L (ref 9–17)
BASOPHILS # BLD: 1 % (ref 0–2)
BASOPHILS ABSOLUTE: 0 K/UL (ref 0–0.2)
BUN BLDV-MCNC: 18 MG/DL (ref 8–23)
BUN/CREAT BLD: 15 (ref 9–20)
CALCIUM SERPL-MCNC: 9.6 MG/DL (ref 8.6–10.4)
CHLORIDE BLD-SCNC: 99 MMOL/L (ref 98–107)
CO2: 28 MMOL/L (ref 20–31)
CREAT SERPL-MCNC: 1.22 MG/DL (ref 0.7–1.2)
DIFFERENTIAL TYPE: ABNORMAL
EOSINOPHILS RELATIVE PERCENT: 1 % (ref 1–8)
GFR AFRICAN AMERICAN: >60 ML/MIN
GFR NON-AFRICAN AMERICAN: 58 ML/MIN
GFR SERPL CREATININE-BSD FRML MDRD: ABNORMAL ML/MIN/{1.73_M2}
GFR SERPL CREATININE-BSD FRML MDRD: ABNORMAL ML/MIN/{1.73_M2}
GLUCOSE BLD-MCNC: 240 MG/DL (ref 70–99)
HCT VFR BLD CALC: 29.1 % (ref 41–53)
HEMOGLOBIN: 9.2 G/DL (ref 13.5–17.5)
IMMATURE GRANULOCYTES: ABNORMAL %
LYMPHOCYTES # BLD: 11 % (ref 15–43)
MCH RBC QN AUTO: 25 PG (ref 26–34)
MCHC RBC AUTO-ENTMCNC: 31.6 G/DL (ref 31–37)
MCV RBC AUTO: 79 FL (ref 80–100)
MONOCYTES # BLD: 11 % (ref 6–14)
PDW BLD-RTO: 17.9 % (ref 11–14.5)
PHOSPHORUS: 3.7 MG/DL (ref 2.5–4.5)
PLATELET # BLD: 333 K/UL (ref 140–450)
PLATELET ESTIMATE: ABNORMAL
PMV BLD AUTO: 7.8 FL (ref 6–12)
POTASSIUM SERPL-SCNC: 4.5 MMOL/L (ref 3.7–5.3)
RBC # BLD: 3.69 M/UL (ref 4.5–5.9)
RBC # BLD: ABNORMAL 10*6/UL
SEG NEUTROPHILS: 76 % (ref 44–74)
SEGMENTED NEUTROPHILS ABSOLUTE COUNT: 4.7 K/UL (ref 1.8–7.7)
SODIUM BLD-SCNC: 139 MMOL/L (ref 135–144)
WBC # BLD: 6.1 K/UL (ref 3.5–11)
WBC # BLD: ABNORMAL 10*3/UL

## 2017-11-15 PROCEDURE — 85025 COMPLETE CBC W/AUTO DIFF WBC: CPT

## 2017-11-15 PROCEDURE — 80195 ASSAY OF SIROLIMUS: CPT

## 2017-11-15 PROCEDURE — 36415 COLL VENOUS BLD VENIPUNCTURE: CPT

## 2017-11-15 PROCEDURE — 80069 RENAL FUNCTION PANEL: CPT

## 2017-11-17 LAB — RAPAMUNE: 9 NG/ML

## 2017-11-22 ENCOUNTER — TELEPHONE (OUTPATIENT)
Dept: UROLOGY | Age: 72
End: 2017-11-22

## 2017-11-22 NOTE — TELEPHONE ENCOUNTER
Called [facility], where the patient currently resides and talked with his nurse. Asked if the patient was currently on an antibiotic or was taking one for a recent UTI. Nurse states he is on a long term course of Doxycycline, and he recently worked with a different urologist who removed multiple kidney stones. She states that he is currently asymptomatic.

## 2018-02-02 ENCOUNTER — OFFICE VISIT (OUTPATIENT)
Dept: PODIATRY | Age: 73
End: 2018-02-02
Payer: MEDICARE

## 2018-02-02 VITALS
SYSTOLIC BLOOD PRESSURE: 138 MMHG | BODY MASS INDEX: 37.79 KG/M2 | DIASTOLIC BLOOD PRESSURE: 82 MMHG | WEIGHT: 279 LBS | HEIGHT: 72 IN | HEART RATE: 88 BPM

## 2018-02-02 DIAGNOSIS — E11.51 CONTROLLED TYPE 2 DM WITH PERIPHERAL CIRCULATORY DISORDER (HCC): Primary | ICD-10-CM

## 2018-02-02 DIAGNOSIS — M21.6X9 EQUINUS DEFORMITY OF FOOT: ICD-10-CM

## 2018-02-02 DIAGNOSIS — B35.1 DERMATOPHYTOSIS OF NAIL: ICD-10-CM

## 2018-02-02 PROCEDURE — 11721 DEBRIDE NAIL 6 OR MORE: CPT | Performed by: PODIATRIST

## 2018-02-02 PROCEDURE — 99202 OFFICE O/P NEW SF 15 MIN: CPT | Performed by: PODIATRIST

## 2018-02-02 NOTE — PATIENT INSTRUCTIONS
Patient Education        Diabetes Foot Health: Care Instructions  Your Care Instructions    When you have diabetes, your feet need extra care and attention. Diabetes can damage the nerve endings and blood vessels in your feet, making you less likely to notice when your feet are injured. Diabetes also limits your body's ability to fight infection and get blood to areas that need it. If you get a minor foot injury, it could become an ulcer or a serious infection. With good foot care, you can prevent most of these problems. Caring for your feet can be quick and easy. Most of the care can be done when you are bathing or getting ready for bed. Follow-up care is a key part of your treatment and safety. Be sure to make and go to all appointments, and call your doctor if you are having problems. It's also a good idea to know your test results and keep a list of the medicines you take. How can you care for yourself at home? · Keep your blood sugar close to normal by watching what and how much you eat, monitoring blood sugar, taking medicines if prescribed, and getting regular exercise. · Do not smoke. Smoking affects blood flow and can make foot problems worse. If you need help quitting, talk to your doctor about stop-smoking programs and medicines. These can increase your chances of quitting for good. · Eat a diet that is low in fats. High fat intake can cause fat to build up in your blood vessels and decrease blood flow. · Inspect your feet daily for blisters, cuts, cracks, or sores. If you cannot see well, use a mirror or have someone help you. · Take care of your feet:  [facility] your feet every day. Use warm (not hot) water. Check the water temperature with your wrists or other part of your body, not your feet. ¨ Dry your feet well. Pat them dry. Do not rub the skin on your feet too hard. Dry well between your toes.  If the skin on your feet stays moist, bacteria or a fungus can grow, which can lead to infection. ¨ Keep your skin soft. Use moisturizing skin cream to keep the skin on your feet soft and prevent calluses and cracks. But do not put the cream between your toes, and stop using any cream that causes a rash. ¨ Clean underneath your toenails carefully. Do not use a sharp object to clean underneath your toenails. Use the blunt end of a nail file or other rounded tool. ¨ Trim and file your toenails straight across to prevent ingrown toenails. Use a nail clipper, not scissors. Use an emery board to smooth the edges. · Change socks daily. Socks without seams are best, because seams often rub the feet. You can find socks for people with diabetes from specialty catalogs. · Look inside your shoes every day for things like gravel or torn linings, which could cause blisters or sores. · Buy shoes that fit well:  ¨ Look for shoes that have plenty of space around the toes. This helps prevent bunions and blisters. ¨ Try on shoes while wearing the kind of socks you will usually wear with the shoes. ¨ Avoid plastic shoes. They may rub your feet and cause blisters. Good shoes should be made of materials that are flexible and breathable, such as leather or cloth. ¨ Break in new shoes slowly by wearing them for no more than an hour a day for several days. Take extra time to check your feet for red areas, blisters, or other problems after you wear new shoes. · Do not go barefoot. Do not wear sandals, and do not wear shoes with very thin soles. Thin soles are easy to puncture. They also do not protect your feet from hot pavement or cold weather. · Have your doctor check your feet during each visit. If you have a foot problem, see your doctor. Do not try to treat an early foot problem at home. Home remedies or treatments that you can buy without a prescription (such as corn removers) can be harmful. · Always get early treatment for foot problems.  A minor irritation can lead to a major problem if not properly cared for early. When should you call for help? Call your doctor now or seek immediate medical care if:  ? · You have a foot sore, an ulcer or break in the skin that is not healing after 4 days, bleeding corns or calluses, or an ingrown toenail. ? · You have blue or black areas, which can mean bruising or blood flow problems. ? · You have peeling skin or tiny blisters between your toes or cracking or oozing of the skin. ? · You have a fever for more than 24 hours and a foot sore. ? · You have new numbness or tingling in your feet that does not go away after you move your feet or change positions. ? · You have unexplained or unusual swelling of the foot or ankle. ? Watch closely for changes in your health, and be sure to contact your doctor if:  ? · You cannot do proper foot care. Where can you learn more? Go to https://[employer]. org and sign in to your [employer] account. Enter A739 in the [employer] box to learn more about \"Diabetes Foot Health: Care Instructions. \"     If you do not have an account, please click on the \"Sign Up Now\" link. Current as of: March 13, 2017  Content Version: 11.5  © 3626-6386 Healthwise, Incorporated. Care instructions adapted under license by [facility] ([facility]). If you have questions about a medical condition or this instruction, always ask your healthcare professional. [place] [address] any warranty or liability for your use of this information.

## 2018-04-13 ENCOUNTER — OFFICE VISIT (OUTPATIENT)
Dept: PODIATRY | Age: 73
End: 2018-04-13
Payer: MEDICARE

## 2018-04-13 VITALS
SYSTOLIC BLOOD PRESSURE: 124 MMHG | HEART RATE: 88 BPM | BODY MASS INDEX: 37.79 KG/M2 | HEIGHT: 72 IN | DIASTOLIC BLOOD PRESSURE: 76 MMHG | RESPIRATION RATE: 24 BRPM | WEIGHT: 279 LBS

## 2018-04-13 DIAGNOSIS — B35.1 DERMATOPHYTOSIS OF NAIL: ICD-10-CM

## 2018-04-13 DIAGNOSIS — E11.51 CONTROLLED TYPE 2 DM WITH PERIPHERAL CIRCULATORY DISORDER (HCC): Primary | ICD-10-CM

## 2018-04-13 PROCEDURE — 11721 DEBRIDE NAIL 6 OR MORE: CPT | Performed by: PODIATRIST

## 2018-06-22 ENCOUNTER — OFFICE VISIT (OUTPATIENT)
Dept: PODIATRY | Age: 73
End: 2018-06-22
Payer: MEDICARE

## 2018-06-22 VITALS
WEIGHT: 283 LBS | DIASTOLIC BLOOD PRESSURE: 74 MMHG | HEART RATE: 84 BPM | HEIGHT: 72 IN | BODY MASS INDEX: 38.33 KG/M2 | SYSTOLIC BLOOD PRESSURE: 130 MMHG

## 2018-06-22 DIAGNOSIS — B35.1 DERMATOPHYTOSIS OF NAIL: ICD-10-CM

## 2018-06-22 DIAGNOSIS — E11.51 CONTROLLED TYPE 2 DM WITH PERIPHERAL CIRCULATORY DISORDER (HCC): Primary | ICD-10-CM

## 2018-06-22 PROCEDURE — 99999 PR OFFICE/OUTPT VISIT,PROCEDURE ONLY: CPT | Performed by: PODIATRIST

## 2018-06-22 PROCEDURE — 11721 DEBRIDE NAIL 6 OR MORE: CPT | Performed by: PODIATRIST

## 2018-08-31 ENCOUNTER — OFFICE VISIT (OUTPATIENT)
Dept: PODIATRY | Age: 73
End: 2018-08-31
Payer: MEDICARE

## 2018-08-31 VITALS
WEIGHT: 282 LBS | SYSTOLIC BLOOD PRESSURE: 138 MMHG | HEIGHT: 72 IN | DIASTOLIC BLOOD PRESSURE: 80 MMHG | BODY MASS INDEX: 38.19 KG/M2 | HEART RATE: 62 BPM

## 2018-08-31 DIAGNOSIS — E11.51 CONTROLLED TYPE 2 DM WITH PERIPHERAL CIRCULATORY DISORDER (HCC): Primary | ICD-10-CM

## 2018-08-31 DIAGNOSIS — B35.1 DERMATOPHYTOSIS OF NAIL: ICD-10-CM

## 2018-08-31 PROCEDURE — 11721 DEBRIDE NAIL 6 OR MORE: CPT | Performed by: PODIATRIST

## 2018-08-31 PROCEDURE — 99999 PR OFFICE/OUTPT VISIT,PROCEDURE ONLY: CPT | Performed by: PODIATRIST

## 2018-08-31 NOTE — PROGRESS NOTES
Subjective:  Patient presents to [facility] today for routine diabetic foot care. Patient's diabetic control has been not changed. No n/v/f/c/d. Allergies   Allergen Reactions    Vitamin K And Related Hives     Is able to take the pill form of vitamin K    Benadryl [Diphenhydramine] Rash       Past Medical History:   Diagnosis Date    Cancer ([facility] [address]     skin    Chronic kidney disease     [facility] mellitus ([facility]     Hyperlipidemia     Hypertension     Neuropathy     Thyroid disease     Type 2 diabetes mellitus without complication ([facility]        Prior to Admission medications    Medication Sig Start Date End Date Taking? Authorizing Provider   doxycycline hyclate (VIBRAMYCIN) 100 MG capsule Take 100 mg by mouth 2 times daily   Yes Historical Provider, MD   tamsulosin (FLOMAX) 0.4 MG capsule Take 0.4 mg by mouth daily   Yes Historical Provider, MD   gabapentin (NEURONTIN) 100 MG capsule Take 100 mg by mouth 3 times daily   Yes Historical Provider, MD   Compression Stockings MISC by Does not apply route   Yes Historical Provider, MD   calcitRIOL (ROCALTROL) 0.25 MCG capsule Take 0.25 mcg by mouth daily. Yes Historical Provider, MD   fenofibrate (TRICOR) 145 MG tablet Take 145 mg by mouth daily. Yes Historical Provider, MD   Vitamin D (CHOLECALCIFEROL) 1000 UNITS CAPS capsule Take 1,000 Units by mouth daily. Yes Historical Provider, MD   sirolimus (RAPAMUNE) 1 MG tablet Take 2 mg by mouth daily. Yes Historical Provider, MD   MYCOPHENOLATE MOFETIL PO Take  by mouth. Indications: 1000mg - BID   Yes Historical Provider, MD   levothyroxine (SYNTHROID) 50 MCG tablet Take 50 mcg by mouth Daily. Yes Historical Provider, MD   diphenoxylate-atropine (LOMOTIL) 2.5-0.025 MG per tablet Take 1 tablet by mouth 4 times daily as needed for Diarrhea. Yes Historical Provider, MD   amLODIPine (NORVASC) 5 MG tablet Take 5 mg by mouth daily.    Yes Historical Provider, MD   furosemide (LASIX) 20 MG tablet

## 2018-12-04 LAB
CREATININE, URINE: NORMAL
MICROALBUMIN/CREAT 24H UR: 20.1 MG/G{CREAT}
MICROALBUMIN/CREAT UR-RTO: NORMAL

## 2019-01-08 ENCOUNTER — OFFICE VISIT (OUTPATIENT)
Dept: PODIATRY | Age: 74
End: 2019-01-08
Payer: MEDICARE

## 2019-01-08 VITALS
WEIGHT: 273.6 LBS | DIASTOLIC BLOOD PRESSURE: 76 MMHG | RESPIRATION RATE: 20 BRPM | SYSTOLIC BLOOD PRESSURE: 136 MMHG | BODY MASS INDEX: 37.06 KG/M2 | HEIGHT: 72 IN | HEART RATE: 84 BPM

## 2019-01-08 DIAGNOSIS — B35.1 DERMATOPHYTOSIS OF NAIL: ICD-10-CM

## 2019-01-08 DIAGNOSIS — L84 CORNS AND CALLOSITIES: ICD-10-CM

## 2019-01-08 DIAGNOSIS — E11.49 DM (DIABETES MELLITUS), TYPE 2 WITH NEUROLOGICAL COMPLICATIONS (HCC): Primary | ICD-10-CM

## 2019-01-08 PROCEDURE — 11721 DEBRIDE NAIL 6 OR MORE: CPT | Performed by: PODIATRIST

## 2019-01-08 PROCEDURE — 11055 PARING/CUTG B9 HYPRKER LES 1: CPT | Performed by: PODIATRIST

## 2019-01-08 PROCEDURE — 99999 PR OFFICE/OUTPT VISIT,PROCEDURE ONLY: CPT | Performed by: PODIATRIST

## 2019-01-08 RX ORDER — CEPHALEXIN 500 MG/1
CAPSULE ORAL
COMMUNITY
Start: 2018-12-20

## 2019-01-08 RX ORDER — TACROLIMUS 1 MG/1
CAPSULE ORAL
COMMUNITY
Start: 2018-12-04

## 2019-03-19 ENCOUNTER — OFFICE VISIT (OUTPATIENT)
Dept: PODIATRY | Age: 74
End: 2019-03-19
Payer: MEDICARE

## 2019-03-19 VITALS
WEIGHT: 271.2 LBS | RESPIRATION RATE: 20 BRPM | HEART RATE: 88 BPM | SYSTOLIC BLOOD PRESSURE: 132 MMHG | DIASTOLIC BLOOD PRESSURE: 68 MMHG | HEIGHT: 72 IN | BODY MASS INDEX: 36.73 KG/M2

## 2019-03-19 DIAGNOSIS — L84 CORNS AND CALLOSITIES: ICD-10-CM

## 2019-03-19 DIAGNOSIS — B35.1 DERMATOPHYTOSIS OF NAIL: ICD-10-CM

## 2019-03-19 DIAGNOSIS — E11.49 DM (DIABETES MELLITUS), TYPE 2 WITH NEUROLOGICAL COMPLICATIONS (HCC): Primary | ICD-10-CM

## 2019-03-19 PROCEDURE — 99999 PR OFFICE/OUTPT VISIT,PROCEDURE ONLY: CPT | Performed by: PODIATRIST

## 2019-03-19 PROCEDURE — 11055 PARING/CUTG B9 HYPRKER LES 1: CPT | Performed by: PODIATRIST

## 2019-03-19 PROCEDURE — 11721 DEBRIDE NAIL 6 OR MORE: CPT | Performed by: PODIATRIST

## 2019-06-04 ENCOUNTER — OFFICE VISIT (OUTPATIENT)
Dept: PODIATRY | Age: 74
End: 2019-06-04
Payer: MEDICARE

## 2019-06-04 VITALS
HEIGHT: 72 IN | BODY MASS INDEX: 37.33 KG/M2 | HEART RATE: 88 BPM | SYSTOLIC BLOOD PRESSURE: 138 MMHG | RESPIRATION RATE: 24 BRPM | WEIGHT: 275.6 LBS | DIASTOLIC BLOOD PRESSURE: 74 MMHG

## 2019-06-04 DIAGNOSIS — L84 CORNS AND CALLOSITIES: ICD-10-CM

## 2019-06-04 DIAGNOSIS — B35.1 DERMATOPHYTOSIS OF NAIL: ICD-10-CM

## 2019-06-04 DIAGNOSIS — E11.49 DM (DIABETES MELLITUS), TYPE 2 WITH NEUROLOGICAL COMPLICATIONS (HCC): Primary | ICD-10-CM

## 2019-06-04 PROCEDURE — 99999 PR OFFICE/OUTPT VISIT,PROCEDURE ONLY: CPT | Performed by: PODIATRIST

## 2019-06-04 PROCEDURE — 11721 DEBRIDE NAIL 6 OR MORE: CPT | Performed by: PODIATRIST

## 2019-06-04 PROCEDURE — 11055 PARING/CUTG B9 HYPRKER LES 1: CPT | Performed by: PODIATRIST

## 2019-06-04 NOTE — PROGRESS NOTES
Foot Care Worksheet  PCP: Tamir Escobar DO  Last visit: 4/2019 patient unsure of the day    Nail description:  Thick , Yellow , Crumbly , Marked limitation of ambulation     Pain resulting from thickened and dystrophy of nail plate No    Nails involved  Right   1, 2, 3, 4, 5  (T5-T9)  Left     1, 2, 3, 4, 5  (TA-T4)    Q7 1 Class A Finding - Non traumatic amputation of foot No    Q8 2 Class B Findings - Absent DP pulse No, Absent PT pulse No, Advanced tropic changes (3 required) Yes    Decrease hair growth Yes, Nail changes/thickening Yes, Pigmented changes/discoloration Yes, Skin texture (thin, shiny) Yes, Skin color (rubor/redness) No    Q9 1 Class B and 2 Class C Findings  Claudication No, Temperature change Yes, Paresthesia Yes a, Burning No, Edema Yes

## 2019-06-04 NOTE — PROGRESS NOTES
Subjective:  Patient presents to [facility] today for routine diabetic foot care. Patient's diabetic control has been not changed. No n/v/f/c/d. Allergies   Allergen Reactions    Heparin     Menadione      Is able to take the pill form of vitamin K    Phytonadione     Vitamin K And Related Hives     Is able to take the pill form of vitamin K    Benadryl [Diphenhydramine] Rash and Other (See Comments)     Pt had difficulty breathing when he was given benadryl and heparin IV, he has no problems with oral benadryl. Past Medical History:   Diagnosis Date    Cancer ([facility] [address]     skin    Chronic kidney disease     Diabetes mellitus ([facility] [address]     Hyperlipidemia     Hypertension     Neuropathy     Thyroid disease     Type 2 diabetes mellitus without complication ([facility]        Prior to Admission medications    Medication Sig Start Date End Date Taking? Authorizing Provider   cephALEXin (KEFLEX) 500 MG capsule  12/20/18  Yes Historical Provider, MD   tacrolimus (PROGRAF) 1 MG capsule  12/4/18  Yes Historical Provider, MD   doxycycline hyclate (VIBRAMYCIN) 100 MG capsule Take 100 mg by mouth 2 times daily   Yes Historical Provider, MD   tamsulosin (FLOMAX) 0.4 MG capsule Take 0.4 mg by mouth daily   Yes Historical Provider, MD   gabapentin (NEURONTIN) 100 MG capsule Take 100 mg by mouth 3 times daily   Yes Historical Provider, MD   Compression Stockings MISC by Does not apply route   Yes Historical Provider, MD   calcitRIOL (ROCALTROL) 0.25 MCG capsule Take 0.25 mcg by mouth daily. Yes Historical Provider, MD   fenofibrate (TRICOR) 145 MG tablet Take 145 mg by mouth daily. Yes Historical Provider, MD   Vitamin D (CHOLECALCIFEROL) 1000 UNITS CAPS capsule Take 1,000 Units by mouth daily. Yes Historical Provider, MD   sirolimus (RAPAMUNE) 1 MG tablet Take 2 mg by mouth daily. Yes Historical Provider, MD   MYCOPHENOLATE MOFETIL PO Take  by mouth.  Indications: 1000mg - BID   Yes Historical Provider, MD   levothyroxine (SYNTHROID) 50 MCG tablet Take 50 mcg by mouth Daily. Yes Historical Provider, MD   diphenoxylate-atropine (LOMOTIL) 2.5-0.025 MG per tablet Take 1 tablet by mouth 4 times daily as needed for Diarrhea. Yes Historical Provider, MD   amLODIPine (NORVASC) 5 MG tablet Take 5 mg by mouth daily. Yes Historical Provider, MD   furosemide (LASIX) 20 MG tablet Take 20 mg by mouth daily. Yes Historical Provider, MD   CALCIUM POLYCARBOPHIL PO Take 500 mg by mouth daily. Yes Historical Provider, MD   atorvastatin (LIPITOR) 40 MG tablet Take 40 mg by mouth daily. Yes Historical Provider, MD   HYDROcodone-acetaminophen (NORCO) 5-325 MG per tablet Take 1 tablet by mouth every 6 hours as needed for Pain (1 or 2 tabs). Yes Historical Provider, MD   Fish Oil OIL by Does not apply route. Yes Historical Provider, MD   Omega-3 Fatty Acids (FISH OIL PEARLS PO) Take  by mouth 2 times daily. Yes Historical Provider, MD   Pantoprazole Sodium (PROTONIX) 40 MG PACK packet Take 40 mg by mouth daily. Yes Historical Provider, MD   insulin regular (HUMULIN R;NOVOLIN R) 100 UNIT/ML injection Inject  into the skin See Admin Instructions. Indications: sliding scale   Yes Historical Provider, MD   insulin NPH (HUMULIN N;NOVOLIN N) 100 UNIT/ML injection vial Inject  into the skin 2 times daily (before meals). Indications: 60units in the am and 50units in the pm   Yes Historical Provider, MD       Social History     Tobacco Use    Smoking status: Never Smoker    Smokeless tobacco: Never Used   Substance Use Topics    Alcohol use: No     Review of Systems: All 12 systems reviewed and pertinent positives noted above.   Objective:  Vascular: DP and PT pulses palpable 2/4, bilateral.  CFT <3 seconds, bilateral.  Hair growth present to the level of the digits, bilateral.  Edema present, bilateral.  Varicosities present, bilateral. Erythema absent, bilateral. Distal Rubor absent bilateral.  Temperature within normal limits bilateral. Hyperpigmentation present bilateral. Atrophic skin yes. Neurological: Sensation intact to light touch to level of digits, bilateral.  Protective sensation intact 10/10 sites via 5.07/10g Shelton-Loy Monofilament, bilateral.  negative Tinel's, bilateral.  negative Valleix sign, bilateral.  Vibratory intact bilateral.  Reflexes Decreased bilateral.  Paresthesias positive. Dysthesias negative. Sharp/dull intact bilateral.    Musculoskeletal: Muscle strength 5/5, bilateral.  Pain absent upon palpation bilateral. Normal medial longitudinal arch, bilateral.  Ankle ROM decresed,bilateral.  1st MPJ ROM within normal limits, bilateral.  Dorsally contracted digits absent. No other foot deformities. Integument: Open lesion absent, Bilateral.  Interdigital maceration absent to web spaces,absent Bilateral.  Nails left 1, 2, 3, 4, 5 and right 1, 2, 3, 4, 5 thickened, dystrophic and crumbly, discolored with subungual debris. Fissures absent, Bilateral. Hyperkeratotic tissue is present. Seen sub R 5th met head    Assessment:    Diagnosis Orders   1. DM (diabetes mellitus), type 2 with neurological complications (HCC)   DIABETES FOOT EXAM    NM DEBRIDEMENT OF NAILS, 6 OR MORE    NM TRIM HYPERKERATOTIC SKIN LESION, ONE   2. Dermatophytosis of nail  HM DIABETES FOOT EXAM    NM DEBRIDEMENT OF NAILS, 6 OR MORE   3. Corns and callosities  HM DIABETES FOOT EXAM    NM TRIM HYPERKERATOTIC SKIN LESION, ONE       Plan:  Diabetic foot education and exam.  All nails as mentioned above debrided in length and thickness. Paring of 1 benign hyperkeratotic lesions (as listed above) took place with #10 blade or tissue nippers. Patient advised OTC methods for treatment of the mycotic nails. Patient will follow up in 10 weeks.

## 2019-08-13 ENCOUNTER — OFFICE VISIT (OUTPATIENT)
Dept: PODIATRY | Age: 74
End: 2019-08-13
Payer: MEDICARE

## 2019-08-13 VITALS
WEIGHT: 273.8 LBS | HEART RATE: 72 BPM | DIASTOLIC BLOOD PRESSURE: 70 MMHG | SYSTOLIC BLOOD PRESSURE: 128 MMHG | BODY MASS INDEX: 37.08 KG/M2 | HEIGHT: 72 IN | RESPIRATION RATE: 20 BRPM

## 2019-08-13 DIAGNOSIS — L84 CORNS AND CALLOSITIES: ICD-10-CM

## 2019-08-13 DIAGNOSIS — E11.49 DM (DIABETES MELLITUS), TYPE 2 WITH NEUROLOGICAL COMPLICATIONS (HCC): Primary | ICD-10-CM

## 2019-08-13 DIAGNOSIS — B35.1 DERMATOPHYTOSIS OF NAIL: ICD-10-CM

## 2019-08-13 PROCEDURE — 11055 PARING/CUTG B9 HYPRKER LES 1: CPT | Performed by: PODIATRIST

## 2019-08-13 PROCEDURE — 11721 DEBRIDE NAIL 6 OR MORE: CPT | Performed by: PODIATRIST

## 2019-08-13 PROCEDURE — 99999 PR OFFICE/OUTPT VISIT,PROCEDURE ONLY: CPT | Performed by: PODIATRIST

## 2019-10-23 ENCOUNTER — OFFICE VISIT (OUTPATIENT)
Dept: PODIATRY | Age: 74
End: 2019-10-23
Payer: MEDICARE

## 2019-10-23 VITALS
SYSTOLIC BLOOD PRESSURE: 120 MMHG | HEIGHT: 72 IN | WEIGHT: 271 LBS | BODY MASS INDEX: 36.7 KG/M2 | HEART RATE: 78 BPM | DIASTOLIC BLOOD PRESSURE: 60 MMHG

## 2019-10-23 DIAGNOSIS — E11.49 DM (DIABETES MELLITUS), TYPE 2 WITH NEUROLOGICAL COMPLICATIONS (HCC): Primary | ICD-10-CM

## 2019-10-23 DIAGNOSIS — B35.1 DERMATOPHYTOSIS OF NAIL: ICD-10-CM

## 2019-10-23 DIAGNOSIS — L84 CORNS AND CALLOSITIES: ICD-10-CM

## 2019-10-23 PROCEDURE — 11721 DEBRIDE NAIL 6 OR MORE: CPT | Performed by: PODIATRIST

## 2019-10-23 PROCEDURE — 99999 PR OFFICE/OUTPT VISIT,PROCEDURE ONLY: CPT | Performed by: PODIATRIST

## 2020-01-10 ENCOUNTER — TELEPHONE (OUTPATIENT)
Dept: INTERNAL MEDICINE | Age: 75
End: 2020-01-10

## 2020-04-20 ENCOUNTER — OFFICE VISIT (OUTPATIENT)
Dept: PODIATRY | Age: 75
End: 2020-04-20
Payer: MEDICARE

## 2020-04-20 VITALS
DIASTOLIC BLOOD PRESSURE: 84 MMHG | BODY MASS INDEX: 35.62 KG/M2 | HEIGHT: 72 IN | SYSTOLIC BLOOD PRESSURE: 152 MMHG | WEIGHT: 263 LBS | HEART RATE: 68 BPM

## 2020-04-20 PROCEDURE — 99999 PR OFFICE/OUTPT VISIT,PROCEDURE ONLY: CPT | Performed by: PODIATRIST

## 2020-04-20 PROCEDURE — 11721 DEBRIDE NAIL 6 OR MORE: CPT | Performed by: PODIATRIST

## 2020-04-20 NOTE — PROGRESS NOTES
Subjective:  Patient presents to [facility] today for routine diabetic foot care. Patient's diabetic control has been not changed. No n/v/f/c/d. Allergies   Allergen Reactions    Heparin     Menadione      Is able to take the pill form of vitamin K    Phytonadione     Vitamin K And Related Hives     Is able to take the pill form of vitamin K    Benadryl [Diphenhydramine] Rash and Other (See Comments)     Pt had difficulty breathing when he was given benadryl and heparin IV, he has no problems with oral benadryl. 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 Authorizing Provider   cephALEXin (KEFLEX) 500 MG capsule  12/20/18  Yes Historical Provider, MD   tacrolimus (PROGRAF) 1 MG capsule  12/4/18  Yes Historical Provider, MD   doxycycline hyclate (VIBRAMYCIN) 100 MG capsule Take 100 mg by mouth 2 times daily   Yes Historical Provider, MD   tamsulosin (FLOMAX) 0.4 MG capsule Take 0.4 mg by mouth daily   Yes Historical Provider, MD   gabapentin (NEURONTIN) 100 MG capsule Take 100 mg by mouth 3 times daily   Yes Historical Provider, MD   Compression Stockings MISC by Does not apply route   Yes Historical Provider, MD   calcitRIOL (ROCALTROL) 0.25 MCG capsule Take 0.25 mcg by mouth daily. Yes Historical Provider, MD   fenofibrate (TRICOR) 145 MG tablet Take 145 mg by mouth daily. Yes Historical Provider, MD   Vitamin D (CHOLECALCIFEROL) 1000 UNITS CAPS capsule Take 1,000 Units by mouth daily. Yes Historical Provider, MD   sirolimus (RAPAMUNE) 1 MG tablet Take 2 mg by mouth daily.    Yes Historical Provider, MD   MYCOPHENOLATE MOFETIL PO Take  by mouth. Indications: 1000mg - BID   Yes Historical Provider, MD   levothyroxine (SYNTHROID) 50 MCG tablet Take 50 mcg by mouth Daily. Yes Historical Provider, MD   diphenoxylate-atropine (LOMOTIL) 2.5-0.025 MG per tablet Take 1 tablet by mouth 4 times daily as needed for Diarrhea. Yes Historical Provider, MD   amLODIPine (NORVASC) 5 MG tablet Take 5 mg by mouth daily. Yes Historical Provider, MD   furosemide (LASIX) 20 MG tablet Take 20 mg by mouth daily. Yes Historical Provider, MD   CALCIUM POLYCARBOPHIL PO Take 500 mg by mouth daily. Yes Historical Provider, MD   atorvastatin (LIPITOR) 40 MG tablet Take 40 mg by mouth daily. Yes Historical Provider, MD   HYDROcodone-acetaminophen (NORCO) 5-325 MG per tablet Take 1 tablet by mouth every 6 hours as needed for Pain (1 or 2 tabs). Yes Historical Provider, MD   Fish Oil OIL by Does not apply route. Yes Historical Provider, MD   Omega-3 Fatty Acids (FISH OIL PEARLS PO) Take  by mouth 2 times daily. Yes Historical Provider, MD   Pantoprazole Sodium (PROTONIX) 40 MG PACK packet Take 40 mg by mouth daily. Yes Historical Provider, MD   insulin regular (HUMULIN R;NOVOLIN R) 100 UNIT/ML injection Inject  into the skin See Admin Instructions. Indications: sliding scale   Yes Historical Provider, MD   insulin NPH (HUMULIN N;NOVOLIN N) 100 UNIT/ML injection vial Inject  into the skin 2 times daily (before meals). Indications: 60units in the am and 50units in the pm   Yes Historical Provider, MD       Social History     Tobacco Use    Smoking status: Never Smoker    Smokeless tobacco: Never Used   Substance Use Topics    Alcohol use: No     Review of Systems: All 12 systems reviewed and pertinent positives noted above.   Objective:  Vascular: DP and PT pulses palpable 2/4, bilateral.  CFT <3 seconds, bilateral.  Hair growth present to the level of the digits, bilateral.  Edema present, bilateral.  Varicosities present, bilateral. Erythema

## 2020-06-10 ENCOUNTER — HOSPITAL ENCOUNTER (EMERGENCY)
Age: 75
Discharge: HOME | End: 2020-06-10
Attending: EMERGENCY MEDICINE
Payer: MEDICARE

## 2020-06-10 VITALS
HEART RATE: 85 BPM | SYSTOLIC BLOOD PRESSURE: 166 MMHG | DIASTOLIC BLOOD PRESSURE: 85 MMHG | RESPIRATION RATE: 15 BRPM | OXYGEN SATURATION: 97 % | TEMPERATURE: 98.1 F

## 2020-06-10 PROCEDURE — 12011 RPR F/E/E/N/L/M 2.5 CM/<: CPT

## 2020-06-10 PROCEDURE — 99282 EMERGENCY DEPT VISIT SF MDM: CPT

## 2020-06-10 RX ORDER — PREDNISONE 20 MG/1
60 TABLET ORAL ONCE
Status: DISCONTINUED | OUTPATIENT
Start: 2020-06-10 | End: 2020-06-10

## 2020-06-10 ASSESSMENT — PAIN SCALES - GENERAL: PAINLEVEL_OUTOF10: 5

## 2020-06-10 ASSESSMENT — PAIN DESCRIPTION - ORIENTATION: ORIENTATION: LEFT

## 2020-06-10 ASSESSMENT — PAIN DESCRIPTION - PAIN TYPE: TYPE: ACUTE PAIN

## 2020-06-10 ASSESSMENT — PAIN DESCRIPTION - LOCATION: LOCATION: EYE;HEAD

## 2020-06-10 NOTE — ED PROVIDER NOTES
dailyHistorical Med      gabapentin (NEURONTIN) 100 MG capsule Take 100 mg by mouth 3 times dailyHistorical Med      Compression Stockings MISC Historical Med      calcitRIOL (ROCALTROL) 0.25 MCG capsule Take 0.25 mcg by mouth daily. fenofibrate (TRICOR) 145 MG tablet Take 145 mg by mouth daily. Vitamin D (CHOLECALCIFEROL) 1000 UNITS CAPS capsule Take 1,000 Units by mouth daily. sirolimus (RAPAMUNE) 1 MG tablet Take 2 mg by mouth daily. MYCOPHENOLATE MOFETIL PO Take  by mouth. Indications: 1000mg - BID      levothyroxine (SYNTHROID) 50 MCG tablet Take 50 mcg by mouth Daily. diphenoxylate-atropine (LOMOTIL) 2.5-0.025 MG per tablet Take 1 tablet by mouth 4 times daily as needed for Diarrhea. amLODIPine (NORVASC) 5 MG tablet Take 5 mg by mouth daily. furosemide (LASIX) 20 MG tablet Take 20 mg by mouth daily. CALCIUM POLYCARBOPHIL PO Take 500 mg by mouth daily. atorvastatin (LIPITOR) 40 MG tablet Take 40 mg by mouth daily. HYDROcodone-acetaminophen (NORCO) 5-325 MG per tablet Take 1 tablet by mouth every 6 hours as needed for Pain (1 or 2 tabs). Fish Oil OIL by Does not apply route. Omega-3 Fatty Acids (FISH OIL PEARLS PO) Take  by mouth 2 times daily. Pantoprazole Sodium (PROTONIX) 40 MG PACK packet Take 40 mg by mouth daily. insulin regular (HUMULIN R;NOVOLIN R) 100 UNIT/ML injection Inject  into the skin See Admin Instructions. Indications: sliding scale      insulin NPH (HUMULIN N;NOVOLIN N) 100 UNIT/ML injection vial Inject  into the skin 2 times daily (before meals). Indications: 60units in the am and 50units in the pm             ALLERGIES     is allergic to heparin; menadione; phytonadione; vitamin k and related; and benadryl [diphenhydramine]. FAMILY HISTORY     He indicated that his mother is . He indicated that his father is .      family history includes Cancer in his mother; Diabetes in his mother; Heart Disease in his of tissue adhesive were applied. Hemostasis and approximation were obtained    FINAL IMPRESSION      1. Facial laceration, initial encounter    2. Subconjunctival hemorrhage of left eye          DISPOSITION/PLAN   DISPOSITION Decision To Discharge 06/10/2020 06:15:06 AM      Condition on Disposition    Stable    PATIENT REFERRED TO:  DO Gsoia Smiley. [address] [place] [id number]  [phone]      As needed      DISCHARGE MEDICATIONS:  Discharge Medication List as of 6/10/2020  6:15 AM          (Please note that portions of this note were completed with a voice recognition program.  Efforts were made to edit the dictations but occasionally words are mis-transcribed.)    Lee MD, F.A.C.E.P.   Attending Emergency Physician        Rudi Hernandez MD  06/10/20 [id number]

## 2020-07-08 ENCOUNTER — OFFICE VISIT (OUTPATIENT)
Dept: PODIATRY | Age: 75
End: 2020-07-08
Payer: MEDICARE

## 2020-07-08 VITALS
RESPIRATION RATE: 20 BRPM | WEIGHT: 269.8 LBS | BODY MASS INDEX: 36.59 KG/M2 | HEART RATE: 64 BPM | DIASTOLIC BLOOD PRESSURE: 80 MMHG | SYSTOLIC BLOOD PRESSURE: 136 MMHG

## 2020-07-08 PROCEDURE — 11721 DEBRIDE NAIL 6 OR MORE: CPT | Performed by: PODIATRIST

## 2020-07-08 PROCEDURE — 99999 PR OFFICE/OUTPT VISIT,PROCEDURE ONLY: CPT | Performed by: PODIATRIST

## 2020-07-08 NOTE — PROGRESS NOTES
Foot Care Worksheet  PCP: Bj Carrasco,   Last visit: 03/02/2020 per patient    Nail description:  Thick , Yellow , Crumbly , Marked limitation of ambulation     Pain resulting from thickened and dystrophy of nail plate No    Nails involved  Right   1, 2, 3, 4, 5  (T5-T9)  Left     1, 2, 3, 4, 5  (TA-T4)    Q7 1 Class A Finding - Non traumatic amputation of foot No    Q8 2 Class B Findings - Absent DP pulse No, Absent PT pulse No, Advanced tropic changes (3 required) Yes    Decrease hair growth Yes, Nail changes/thickening Yes, Pigmented changes/discoloration Yes, Skin texture (thin, shiny) Yes, Skin color (rubor/redness) No    Q9 1 Class B and 2 Class C Findings  Claudication No, Temperature change Yes, Paresthesia Yes a, Burning No, Edema Yes

## 2020-07-08 NOTE — PROGRESS NOTES
Subjective:  Patient presents to [facility] today for routine diabetic foot care. Patient's diabetic control has been not changed. No n/v/f/c/d. Allergies   Allergen Reactions    Bupropion      Other reaction(s): Other See Comments  unknown    Heparin     Menadione      Is able to take the pill form of vitamin K    Phytonadione     Vitamin K And Related Hives     Is able to take the pill form of vitamin K    Vitamin K1     Diphenhydramine Rash, Other (See Comments) and Hives     Pt had difficulty breathing when he was given benadryl and heparin IV, he has no problems with oral benadryl. When combined with heparin       Past Medical History:   Diagnosis Date    Cancer ([facility] [address]     skin    Chronic kidney disease     Diabetes mellitus ([facility] [address]     [facility] of [facility]    Hyperlipidemia     Hypertension     Neuropathy     Stroke ([facility] [address] 02/02/2020    Thyroid disease     Type 2 diabetes mellitus without complication ([facility] [address]        Prior to Admission medications    Medication Sig Start Date End Date Taking? Authorizing Provider   cephALEXin (KEFLEX) 500 MG capsule  12/20/18  Yes Historical Provider, MD   tacrolimus (PROGRAF) 1 MG capsule  12/4/18  Yes Historical Provider, MD   doxycycline hyclate (VIBRAMYCIN) 100 MG capsule Take 100 mg by mouth 2 times daily   Yes Historical Provider, MD   tamsulosin (FLOMAX) 0.4 MG capsule Take 0.4 mg by mouth daily   Yes Historical Provider, MD   gabapentin (NEURONTIN) 100 MG capsule Take 100 mg by mouth 3 times daily   Yes Historical Provider, MD   Compression Stockings MISC by Does not apply route   Yes Historical Provider, MD   calcitRIOL (ROCALTROL) 0.25 MCG capsule Take 0.25 mcg by mouth daily. Yes Historical Provider, MD   fenofibrate (TRICOR) 145 MG tablet Take 145 mg by mouth daily. Yes Historical Provider, MD   Vitamin D (CHOLECALCIFEROL) 1000 UNITS CAPS capsule Take 1,000 Units by mouth daily.    Yes Historical Provider, MD   sirolimus (RAPAMUNE) 1 MG tablet Take 2 mg by mouth daily. Yes Historical Provider, MD   MYCOPHENOLATE MOFETIL PO Take  by mouth. Indications: 1000mg - BID   Yes Historical Provider, MD   levothyroxine (SYNTHROID) 50 MCG tablet Take 50 mcg by mouth Daily. Yes Historical Provider, MD   diphenoxylate-atropine (LOMOTIL) 2.5-0.025 MG per tablet Take 1 tablet by mouth 4 times daily as needed for Diarrhea. Yes Historical Provider, MD   amLODIPine (NORVASC) 5 MG tablet Take 5 mg by mouth daily. Yes Historical Provider, MD   furosemide (LASIX) 20 MG tablet Take 20 mg by mouth daily. Yes Historical Provider, MD   CALCIUM POLYCARBOPHIL PO Take 500 mg by mouth daily. Yes Historical Provider, MD   atorvastatin (LIPITOR) 40 MG tablet Take 40 mg by mouth daily. Yes Historical Provider, MD   HYDROcodone-acetaminophen (NORCO) 5-325 MG per tablet Take 1 tablet by mouth every 6 hours as needed for Pain (1 or 2 tabs). Yes Historical Provider, MD   Fish Oil OIL by Does not apply route. Yes Historical Provider, MD   Omega-3 Fatty Acids (FISH OIL PEARLS PO) Take  by mouth 2 times daily. Yes Historical Provider, MD   Pantoprazole Sodium (PROTONIX) 40 MG PACK packet Take 40 mg by mouth daily. Yes Historical Provider, MD   insulin regular (HUMULIN R;NOVOLIN R) 100 UNIT/ML injection Inject  into the skin See Admin Instructions. Indications: sliding scale   Yes Historical Provider, MD   insulin NPH (HUMULIN N;NOVOLIN N) 100 UNIT/ML injection vial Inject  into the skin 2 times daily (before meals). Indications: 60units in the am and 50units in the pm   Yes Historical Provider, MD       Social History     Tobacco Use    Smoking status: Never Smoker    Smokeless tobacco: Never Used   Substance Use Topics    Alcohol use: No     Review of Systems: All 12 systems reviewed and pertinent positives noted above.   Objective:  Vascular: DP and PT pulses palpable 2/4, bilateral.  CFT <3 seconds, bilateral.  Hair growth present to the level of the digits, bilateral.  Edema present, bilateral.  Varicosities present, bilateral. Erythema absent, bilateral. Distal Rubor absent bilateral.  Temperature within normal limits bilateral. Hyperpigmentation present bilateral. Atrophic skin yes. Neurological: Sensation intact to light touch to level of digits, bilateral.  Protective sensation intact 10/10 sites via 5.07/10g [place] Monofilament, bilateral.  negative Tinel's, bilateral.  negative Valleix sign, bilateral.  Vibratory intact bilateral.  Reflexes Decreased bilateral.  Paresthesias positive. Dysthesias negative. Sharp/dull intact bilateral.    Musculoskeletal: Muscle strength 5/5, bilateral.  Pain absent upon palpation bilateral. Normal medial longitudinal arch, bilateral.  Ankle ROM decresed,bilateral.  1st MPJ ROM within normal limits, bilateral.  Dorsally contracted digits absent. No other foot deformities. Integument: Open lesion absent, Bilateral.  Interdigital maceration absent to web spaces,absent Bilateral.  Nails left 1, 2, 3, 4, 5 and right 1, 2, 3, 4, 5 thickened, dystrophic and crumbly, discolored with subungual debris. Fissures absent, Bilateral. Hyperkeratotic tissue is absent. Seen sub R 5th met head    Assessment:    Diagnosis Orders   1. DM (diabetes mellitus), type 2 with neurological complications ([address]     2. Dermatophytosis of nail     3. Corns and callosities         Plan:  Diabetic foot education and exam.  All nails as mentioned above debrided in length and thickness. Patient advised OTC methods for treatment of the mycotic nails. Patient will follow up in 10 weeks.

## 2020-07-15 ENCOUNTER — HOSPITAL ENCOUNTER (EMERGENCY)
Age: 75
Discharge: HOME | End: 2020-07-15
Attending: EMERGENCY MEDICINE
Payer: MEDICARE

## 2020-07-15 ENCOUNTER — APPOINTMENT (OUTPATIENT)
Dept: GENERAL RADIOLOGY | Age: 75
End: 2020-07-15
Payer: MEDICARE

## 2020-07-15 VITALS
HEIGHT: 72 IN | BODY MASS INDEX: 36.44 KG/M2 | WEIGHT: 269 LBS | TEMPERATURE: 99 F | HEART RATE: 89 BPM | RESPIRATION RATE: 16 BRPM | OXYGEN SATURATION: 98 % | SYSTOLIC BLOOD PRESSURE: 141 MMHG | DIASTOLIC BLOOD PRESSURE: 74 MMHG

## 2020-07-15 PROCEDURE — 6360000002 HC RX W HCPCS: Performed by: EMERGENCY MEDICINE

## 2020-07-15 PROCEDURE — 73560 X-RAY EXAM OF KNEE 1 OR 2: CPT

## 2020-07-15 PROCEDURE — 73502 X-RAY EXAM HIP UNI 2-3 VIEWS: CPT

## 2020-07-15 PROCEDURE — 96372 THER/PROPH/DIAG INJ SC/IM: CPT

## 2020-07-15 PROCEDURE — 99284 EMERGENCY DEPT VISIT MOD MDM: CPT

## 2020-07-15 RX ORDER — ASPIRIN 325 MG
325 TABLET ORAL DAILY
COMMUNITY

## 2020-07-15 RX ORDER — MORPHINE SULFATE 10 MG/ML
10 INJECTION, SOLUTION INTRAMUSCULAR; INTRAVENOUS ONCE
Status: COMPLETED | OUTPATIENT
Start: 2020-07-15 | End: 2020-07-15

## 2020-07-15 RX ADMIN — MORPHINE SULFATE 10 MG: 10 INJECTION, SOLUTION INTRAMUSCULAR; INTRAVENOUS at 16:18

## 2020-07-15 ASSESSMENT — PAIN SCALES - GENERAL
PAINLEVEL_OUTOF10: 3
PAINLEVEL_OUTOF10: 7
PAINLEVEL_OUTOF10: 7

## 2020-07-15 ASSESSMENT — PAIN DESCRIPTION - LOCATION: LOCATION: KNEE

## 2020-07-15 ASSESSMENT — PAIN - FUNCTIONAL ASSESSMENT: PAIN_FUNCTIONAL_ASSESSMENT: 0-10

## 2020-07-15 ASSESSMENT — PAIN DESCRIPTION - ORIENTATION: ORIENTATION: LEFT

## 2020-07-15 NOTE — ED PROVIDER NOTES
(3/10/2013); Breast surgery; Appendectomy; Cholecystectomy; Colon surgery; Pacemaker insertion; Foot surgery; Arterial bypass surgry; angioplasty; hernia repair; Patella fracture surgery (Right, 07/13/2018); hip surgery (Right); Mohs surgery (Right, 04/2019); Carpal tunnel release (Right, 05/2019); and knee surgery (Left, 01/02/2020). CURRENT MEDICATIONS       Previous Medications    AMLODIPINE (NORVASC) 5 MG TABLET    Take 5 mg by mouth daily. ASPIRIN 325 MG TABLET    Take 325 mg by mouth daily    ATORVASTATIN (LIPITOR) 40 MG TABLET    Take 40 mg by mouth daily. CALCITRIOL (ROCALTROL) 0.25 MCG CAPSULE    Take 0.25 mcg by mouth daily. CALCIUM POLYCARBOPHIL PO    Take 500 mg by mouth daily. CEPHALEXIN (KEFLEX) 500 MG CAPSULE        COMPRESSION STOCKINGS MISC    by Does not apply route    CYANOCOBALAMIN 100 MCG TABLET    Take 100 mcg by mouth daily    DIPHENOXYLATE-ATROPINE (LOMOTIL) 2.5-0.025 MG PER TABLET    Take 1 tablet by mouth 4 times daily as needed for Diarrhea. DOXYCYCLINE HYCLATE (VIBRAMYCIN) 100 MG CAPSULE    Take 100 mg by mouth 2 times daily    FENOFIBRATE (TRICOR) 145 MG TABLET    Take 145 mg by mouth daily. FISH OIL OIL    by Does not apply route. FUROSEMIDE (LASIX) 20 MG TABLET    Take 20 mg by mouth daily. GABAPENTIN (NEURONTIN) 100 MG CAPSULE    Take 100 mg by mouth 3 times daily    HYDROCODONE-ACETAMINOPHEN (NORCO) 5-325 MG PER TABLET    Take 1 tablet by mouth every 6 hours as needed for Pain (1 or 2 tabs). INSULIN NPH (HUMULIN N;NOVOLIN N) 100 UNIT/ML INJECTION VIAL    Inject  into the skin 2 times daily (before meals). Indications: 60units in the am and 50units in the pm    INSULIN REGULAR (HUMULIN R;NOVOLIN R) 100 UNIT/ML INJECTION    Inject  into the skin See Admin Instructions. Indications: sliding scale    LEVOTHYROXINE (SYNTHROID) 50 MCG TABLET    Take 50 mcg by mouth Daily. MYCOPHENOLATE MOFETIL PO    Take  by mouth.  Indications: 1000mg - BID    OMEGA-3 FATTY ACIDS (FISH OIL PEARLS PO)    Take  by mouth 2 times daily. PANTOPRAZOLE SODIUM (PROTONIX) 40 MG PACK PACKET    Take 40 mg by mouth daily. SIROLIMUS (RAPAMUNE) 1 MG TABLET    Take 2 mg by mouth daily. TACROLIMUS (PROGRAF) 1 MG CAPSULE        TAMSULOSIN (FLOMAX) 0.4 MG CAPSULE    Take 0.4 mg by mouth daily    VITAMIN D (CHOLECALCIFEROL) 1000 UNITS CAPS CAPSULE    Take 1,000 Units by mouth daily. ALLERGIES     is allergic to bupropion; heparin; menadione; phytonadione; vitamin k and related; vitamin k1; and diphenhydramine. FAMILY HISTORY     He indicated that his mother is . He indicated that his father is . family history includes Cancer in his mother; Diabetes in his mother; Heart Disease in his mother. SOCIAL HISTORY      reports that he has never smoked. He has never used smokeless tobacco. He reports that he does not drink alcohol or use drugs. PHYSICAL EXAM     INITIAL VITALS:  height is 6' (1.829 m) and weight is 269 lb (122 kg). His temperature is 99 °F (37.2 °C). His blood pressure is 129/61 and his pulse is 93. His respiration is 16 and oxygen saturation is 98%. Patient is alert and oriented, in no apparent distress. HEENT is atraumatic. Pupils are PERRL at 4 mm with normal extraocular motion. Mucous membranes moist.    Neck is supple with no lymphadenopathy. No JVD. No meningismus. Heart sounds regular rate and rhythm with no gallops, murmurs, or rubs. Lungs clear, no wheezes, rales or rhonchi. Abdomen: soft, nontender with no pain to palpation. Musculoskeletal exam:  Examination of patient's left lower extremity demonstrates swelling around the knee area. Some crepitance is appreciated, but there are no open wounds. It is unknown if this is new or old. Patient has a normal dorsalis pedis pulse. Examination of right lower extremity demonstrates well-healed scar over the knee from prior surgery.   The patient's hip can be internally and externally rotated without pain. He has mild pain with palpation over the hip area laterally. He has no shortening. Normal dorsalis pedis pulses appreciated. No upper extremity injury is noted. No pain is noted in the patient's spine. Normal distal pulses in all extremities. Skin: no rash or edema. Neurological exam reveals cranial nerves 2 through 12 grossly intact. Patient has equal  and normal deep tendon reflexes. Psychiatric: no hallucinations or suicidal ideation. Lymphatics.:  No lymphadenopathy. DIFFERENTIAL DIAGNOSIS/ MDM:     Fracture, contusion, dislocation    DIAGNOSTIC RESULTS       RADIOLOGY:   I directly visualized the following  images and reviewed the radiologist interpretations:  XR HIP 2-3 VW W PELVIS RIGHT   Final Result   No acute osseous or soft tissue abnormality. Degenerative change of the   lower lumbar spine, SI joints, and left hip joint. Right hip arthroplasty without evidence for complication. XR KNEE LEFT (1-2 VIEWS)   Final Result   Irregularity involving the superior aspect of the patella that may correlate   with patient's history of patellar fracture. Total knee arthroplasty without evidence for complication. XR HIP 2-3 VW W PELVIS RIGHT (Final result)   Result time 07/15/20 16:32:40   Final result by Chandni Madrid MD (07/15/20 16:32:40)                 Impression:     No acute osseous or soft tissue abnormality.  Degenerative change of the   lower lumbar spine, SI joints, and left hip joint. Right hip arthroplasty without evidence for complication. Narrative:     EXAMINATION:   ONE XRAY VIEW OF THE PELVIS AND TWO XRAY VIEWS RIGHT HIP     7/15/2020 4:09 pm     COMPARISON:   None.      HISTORY:   ORDERING SYSTEM PROVIDED HISTORY: fall   TECHNOLOGIST PROVIDED HISTORY:   fall   Reason for Exam: Pt has a fx of his left patella; states he took his knee   immobilizer off to use the restroom and fell; having °F (37.2 °C), Pulse: 93, Resp: 16      Re-evaluation Notes    The patient can continue to wear his knee immobilizer. It does not appear he has any new fractures on the knee. His right hip prosthesis is in good condition and there is no evidence of new bony injury. He can apply ice. The patient has pain medicine to take at home. He is discharged in good condition. Controlled Substance Monitoring:    Acute and Chronic Pain Monitoring:   No flowsheet data found. FINAL IMPRESSION      1.  Contusion of right hip, initial encounter          DISPOSITION/PLAN   DISPOSITION Decision To Discharge 07/15/2020 04:40:45 PM      Condition on Disposition    good    PATIENT REFERRED TO:  your orthopedist      tomorrow as scheduled      DISCHARGE MEDICATIONS:  New Prescriptions    No medications on file       (Please note that portions of this note were completed with a voice recognition program.  Efforts were made to edit the dictations but occasionally words are mis-transcribed.)    Bailey MD   Attending Emergency Physician         Kale Montejo MD  07/15/20 [id number]

## 2020-09-16 ENCOUNTER — OFFICE VISIT (OUTPATIENT)
Dept: PODIATRY | Age: 75
End: 2020-09-16
Payer: MEDICARE

## 2020-09-16 VITALS
DIASTOLIC BLOOD PRESSURE: 60 MMHG | SYSTOLIC BLOOD PRESSURE: 124 MMHG | RESPIRATION RATE: 24 BRPM | WEIGHT: 265 LBS | BODY MASS INDEX: 35.94 KG/M2 | HEART RATE: 68 BPM

## 2020-09-16 PROCEDURE — 11721 DEBRIDE NAIL 6 OR MORE: CPT | Performed by: PODIATRIST

## 2020-09-16 PROCEDURE — 99999 PR OFFICE/OUTPT VISIT,PROCEDURE ONLY: CPT | Performed by: PODIATRIST

## 2020-09-16 NOTE — PROGRESS NOTES
Foot Care Worksheet  PCP: Deny Potter DO  Last visit: 03/02/2020 per patient    Nail description:  Thick , Yellow , Crumbly , Marked limitation of ambulation     Pain resulting from thickened and dystrophy of nail plate No    Nails involved  Right   1, 2, 3, 4, 5  (T5-T9)  Left     1, 2, 3, 4, 5  (TA-T4)    Q7 1 Class A Finding - Non traumatic amputation of foot No    Q8 2 Class B Findings - Absent DP pulse No, Absent PT pulse No, Advanced tropic changes (3 required) Yes    Decrease hair growth Yes, Nail changes/thickening Yes, Pigmented changes/discoloration Yes, Skin texture (thin, shiny) Yes, Skin color (rubor/redness) No    Q9 1 Class B and 2 Class C Findings  Claudication No, Temperature change Yes, Paresthesia Yes a, Burning No, Edema Yes
Yes Historical Provider, MD   fenofibrate (TRICOR) 145 MG tablet Take 145 mg by mouth daily. Yes Historical Provider, MD   Vitamin D (CHOLECALCIFEROL) 1000 UNITS CAPS capsule Take 1,000 Units by mouth daily. Yes Historical Provider, MD   sirolimus (RAPAMUNE) 1 MG tablet Take 2 mg by mouth daily. Yes Historical Provider, MD   MYCOPHENOLATE MOFETIL PO Take  by mouth. Indications: 1000mg - BID   Yes Historical Provider, MD   levothyroxine (SYNTHROID) 50 MCG tablet Take 50 mcg by mouth Daily. Yes Historical Provider, MD   diphenoxylate-atropine (LOMOTIL) 2.5-0.025 MG per tablet Take 1 tablet by mouth 4 times daily as needed for Diarrhea. Yes Historical Provider, MD   amLODIPine (NORVASC) 5 MG tablet Take 5 mg by mouth daily. Yes Historical Provider, MD   furosemide (LASIX) 20 MG tablet Take 20 mg by mouth daily. Yes Historical Provider, MD   CALCIUM POLYCARBOPHIL PO Take 500 mg by mouth daily. Yes Historical Provider, MD   atorvastatin (LIPITOR) 40 MG tablet Take 40 mg by mouth daily. Yes Historical Provider, MD   HYDROcodone-acetaminophen (NORCO) 5-325 MG per tablet Take 1 tablet by mouth every 6 hours as needed for Pain (1 or 2 tabs). Yes Historical Provider, MD   Fish Oil OIL by Does not apply route. Yes Historical Provider, MD   Omega-3 Fatty Acids (FISH OIL PEARLS PO) Take  by mouth 2 times daily. Yes Historical Provider, MD   Pantoprazole Sodium (PROTONIX) 40 MG PACK packet Take 40 mg by mouth daily. Yes Historical Provider, MD   insulin regular (HUMULIN R;NOVOLIN R) 100 UNIT/ML injection Inject  into the skin See Admin Instructions. Indications: sliding scale   Yes Historical Provider, MD   insulin NPH (HUMULIN N;NOVOLIN N) 100 UNIT/ML injection vial Inject  into the skin 2 times daily (before meals).  Indications: 60units in the am and 50units in the pm   Yes Historical Provider, MD       Social History     Tobacco Use    Smoking status: Never Smoker    Smokeless tobacco: Never Used

## 2020-12-02 ENCOUNTER — OFFICE VISIT (OUTPATIENT)
Dept: PODIATRY | Age: 75
End: 2020-12-02
Payer: MEDICARE

## 2020-12-02 VITALS
WEIGHT: 258 LBS | SYSTOLIC BLOOD PRESSURE: 132 MMHG | DIASTOLIC BLOOD PRESSURE: 76 MMHG | BODY MASS INDEX: 34.99 KG/M2 | RESPIRATION RATE: 20 BRPM | HEART RATE: 80 BPM

## 2020-12-02 PROCEDURE — 2022F DILAT RTA XM EVC RTNOPTHY: CPT | Performed by: PODIATRIST

## 2020-12-02 PROCEDURE — G8417 CALC BMI ABV UP PARAM F/U: HCPCS | Performed by: PODIATRIST

## 2020-12-02 PROCEDURE — G8427 DOCREV CUR MEDS BY ELIG CLIN: HCPCS | Performed by: PODIATRIST

## 2020-12-02 PROCEDURE — 1123F ACP DISCUSS/DSCN MKR DOCD: CPT | Performed by: PODIATRIST

## 2020-12-02 PROCEDURE — 1036F TOBACCO NON-USER: CPT | Performed by: PODIATRIST

## 2020-12-02 PROCEDURE — 3046F HEMOGLOBIN A1C LEVEL >9.0%: CPT | Performed by: PODIATRIST

## 2020-12-02 PROCEDURE — 11721 DEBRIDE NAIL 6 OR MORE: CPT | Performed by: PODIATRIST

## 2020-12-02 PROCEDURE — G8484 FLU IMMUNIZE NO ADMIN: HCPCS | Performed by: PODIATRIST

## 2020-12-02 PROCEDURE — 99213 OFFICE O/P EST LOW 20 MIN: CPT | Performed by: PODIATRIST

## 2020-12-02 PROCEDURE — 3017F COLORECTAL CA SCREEN DOC REV: CPT | Performed by: PODIATRIST

## 2020-12-02 PROCEDURE — 4040F PNEUMOC VAC/ADMIN/RCVD: CPT | Performed by: PODIATRIST

## 2020-12-02 NOTE — PROGRESS NOTES
Foot Care Worksheet  PCP: Bella Shaikh DO  Last visit: 6 / 11 / 2020 per patient    Nail description:  Thick , Yellow , Crumbly , Marked limitation of ambulation     Pain resulting from thickened and dystrophy of nail plate No    Nails involved  Right   1, 2, 3, 4, 5  (T5-T9)  Left     1, 2, 3, 4, 5  (TA-T4)    Q7 1 Class A Finding - Non traumatic amputation of foot No    Q8 2 Class B Findings - Absent DP pulse No, Absent PT pulse No, Advanced tropic changes (3 required) Yes    Decrease hair growth Yes, Nail changes/thickening Yes, Pigmented changes/discoloration Yes, Skin texture (thin, shiny) Yes, Skin color (rubor/redness) No    Q9 1 Class B and 2 Class C Findings  Claudication No, Temperature change Yes, Paresthesia Yes a, Burning No, Edema Yes

## 2020-12-02 NOTE — PROGRESS NOTES
Subjective:  Patient presents to [facility] today for new issue of rubbing ins his shoes. Pt saw some discoloration on top of toes. Pt denies any new trauma or injury. Pt thinks his shoes are wearing out. No tx started. Pt also presents for routine diabetic foot care. Patient's diabetic control has been not changed. No n/v/f/c/d. Allergies   Allergen Reactions    Bupropion      Other reaction(s): Other See Comments  unknown    Heparin      Other reaction(s): AOF    Menadione      Is able to take the pill form of vitamin K    Phytonadione     Vitamin K     Vitamin K And Related Hives     Is able to take the pill form of vitamin K    Vitamin K1     Diphenhydramine Rash, Other (See Comments) and Hives     Pt had difficulty breathing when he was given benadryl and heparin IV, he has no problems with oral benadryl. When combined with heparin       Past Medical History:   Diagnosis Date    Cancer [address] [address]     skin    Chronic kidney disease     Diabetes mellitus ([facility] [address]     [facility] of [facility]2020    [facility]    Hyperlipidemia     Hypertension     Neuropathy     Stroke ([facility] [address] 02/02/2020    Thyroid disease     Type 2 diabetes mellitus without complication ([facility] [address]        Prior to Admission medications    Medication Sig Start Date End Date Taking?  Authorizing Provider   cyanocobalamin 100 MCG tablet Take 100 mcg by mouth daily   Yes Historical Provider, MD   aspirin 325 MG tablet Take 325 mg by mouth daily   Yes Historical Provider, MD   cephALEXin (KEFLEX) 500 MG capsule  12/20/18  Yes Historical Provider, MD   tacrolimus (PROGRAF) 1 MG capsule  12/4/18  Yes Historical Provider, MD   doxycycline hyclate (VIBRAMYCIN) 100 MG capsule Take 100 mg by mouth 2 times daily   Yes Historical Provider, MD   tamsulosin (FLOMAX) 0.4 MG capsule Take 0.4 mg by mouth daily   Yes Historical Provider, MD   gabapentin (NEURONTIN) 100 MG capsule Take 100 mg by mouth 3 times daily   Yes Historical Provider, MD   Compression Stockings MISC by Does not apply route   Yes Historical Provider, MD   calcitRIOL (ROCALTROL) 0.25 MCG capsule Take 0.25 mcg by mouth daily. Yes Historical Provider, MD   fenofibrate (TRICOR) 145 MG tablet Take 145 mg by mouth daily. Yes Historical Provider, MD   Vitamin D (CHOLECALCIFEROL) 1000 UNITS CAPS capsule Take 1,000 Units by mouth daily. Yes Historical Provider, MD   sirolimus (RAPAMUNE) 1 MG tablet Take 2 mg by mouth daily. Yes Historical Provider, MD   MYCOPHENOLATE MOFETIL PO Take  by mouth. Indications: 1000mg - BID   Yes Historical Provider, MD   levothyroxine (SYNTHROID) 50 MCG tablet Take 50 mcg by mouth Daily. Yes Historical Provider, MD   diphenoxylate-atropine (LOMOTIL) 2.5-0.025 MG per tablet Take 1 tablet by mouth 4 times daily as needed for Diarrhea. Yes Historical Provider, MD   amLODIPine (NORVASC) 5 MG tablet Take 5 mg by mouth daily. Yes Historical Provider, MD   furosemide (LASIX) 20 MG tablet Take 20 mg by mouth daily. Yes Historical Provider, MD   CALCIUM POLYCARBOPHIL PO Take 500 mg by mouth daily. Yes Historical Provider, MD   atorvastatin (LIPITOR) 40 MG tablet Take 40 mg by mouth daily. Yes Historical Provider, MD   HYDROcodone-acetaminophen (NORCO) 5-325 MG per tablet Take 1 tablet by mouth every 6 hours as needed for Pain (1 or 2 tabs). Yes Historical Provider, MD   Fish Oil OIL by Does not apply route. Yes Historical Provider, MD   Omega-3 Fatty Acids (FISH OIL PEARLS PO) Take  by mouth 2 times daily. Yes Historical Provider, MD   Pantoprazole Sodium (PROTONIX) 40 MG PACK packet Take 40 mg by mouth daily. Yes Historical Provider, MD   insulin regular (HUMULIN R;NOVOLIN R) 100 UNIT/ML injection Inject  into the skin See Admin Instructions.  Indications: sliding scale   Yes Historical Provider, MD   insulin NPH (HUMULIN N;NOVOLIN N) 100 UNIT/ML injection vial Inject  into the skin 2 times daily (before toe, unspecified laterality         Plan:  Diabetic foot education and exam.  All nails as mentioned above debrided in length and thickness. Patient advised OTC methods for treatment of the mycotic nails. Patient will follow up in 10 weeks. Orders Placed This Encounter   Medications    Diabetic Shoe MISC     Sig: by Does not apply route Dispense dm shoe and insoles  DM [facility]diabetes mellitus), [facility]  (primary encounter diagnosis)  Dermatophytosis of nail  Equinus deformity of both feet  Hammer toe, unspecified laterality     Dispense:  1 each     Refill:  0     If any wounds arise patient should be seen back in his office immediately. Patient was educated what to watch for.

## 2021-02-17 ENCOUNTER — OFFICE VISIT (OUTPATIENT)
Dept: PODIATRY | Age: 76
End: 2021-02-17
Payer: MEDICARE

## 2021-02-17 VITALS
WEIGHT: 273 LBS | HEART RATE: 84 BPM | SYSTOLIC BLOOD PRESSURE: 136 MMHG | BODY MASS INDEX: 36.98 KG/M2 | HEIGHT: 72 IN | DIASTOLIC BLOOD PRESSURE: 78 MMHG

## 2021-02-17 DIAGNOSIS — B35.1 DERMATOPHYTOSIS OF NAIL: ICD-10-CM

## 2021-02-17 DIAGNOSIS — E11.49 DM (DIABETES MELLITUS), TYPE 2 WITH NEUROLOGICAL COMPLICATIONS (HCC): Primary | ICD-10-CM

## 2021-02-17 PROCEDURE — 11721 DEBRIDE NAIL 6 OR MORE: CPT | Performed by: PODIATRIST

## 2021-02-17 PROCEDURE — 99999 PR OFFICE/OUTPT VISIT,PROCEDURE ONLY: CPT | Performed by: PODIATRIST

## 2021-02-17 NOTE — PROGRESS NOTES
Subjective:  Patient presents to [facility] today for routine diabetic foot care. Patient's diabetic control has been not changed. No n/v/f/c/d. Allergies   Allergen Reactions    Bupropion      Other reaction(s): Other See Comments  unknown    Heparin      Other reaction(s): AOF    Menadione      Is able to take the pill form of vitamin K    Phytonadione     Vitamin K     Vitamin K And Related Hives     Is able to take the pill form of vitamin K    Vitamin K1     Diphenhydramine Rash, Other (See Comments) and Hives     Pt had difficulty breathing when he was given benadryl and heparin IV, he has no problems with oral benadryl. When combined with heparin       Past Medical History:   Diagnosis Date    Cancer ([facility] [address]     skin    Chronic kidney disease     Diabetes mellitus ([facility] [address]     [facility] of [facility]    Hyperlipidemia     Hypertension     Neuropathy     Stroke ([facility] [address] 02/02/2020    Thyroid disease     Type 2 diabetes mellitus without complication ([facility] [address]        Prior to Admission medications    Medication Sig Start Date End Date Taking?  Authorizing Provider   Diabetic Shoe MISC by Does not apply route Dispense dm shoe and insoles  DM (diabetes mellitus), type 2 with neurological complications ([facility] [address]  (primary encounter diagnosis)  Dermatophytosis of nail  Equinus deformity of both feet  Hammer toe, unspecified laterality 12/2/20  Yes Lindsey Stevens DPM   cyanocobalamin 100 MCG tablet Take 100 mcg by mouth daily   Yes Historical Provider, MD   aspirin 325 MG tablet Take 325 mg by mouth daily   Yes Historical Provider, MD   cephALEXin (KEFLEX) 500 MG capsule  12/20/18  Yes Historical Provider, MD   tacrolimus (PROGRAF) 1 MG capsule  12/4/18  Yes Historical Provider, MD   doxycycline hyclate (VIBRAMYCIN) 100 MG capsule Take 100 mg by mouth 2 times daily   Yes Historical Provider, MD   tamsulosin (FLOMAX) 0.4 MG capsule Take 0.4 mg by insulin NPH (HUMULIN N;NOVOLIN N) 100 UNIT/ML injection vial Inject  into the skin 2 times daily (before meals). Indications: 60units in the am and 50units in the pm   Yes Historical Provider, MD       Social History     Tobacco Use    Smoking status: Never Smoker    Smokeless tobacco: Never Used   Substance Use Topics    Alcohol use: No     Review of Systems: All 12 systems reviewed and pertinent positives noted above. Objective:  Vascular: DP and PT pulses palpable 2/4, bilateral.  CFT <3 seconds, bilateral.  Hair growth present to the level of the digits, bilateral.  Edema present, bilateral.  Varicosities present, bilateral. Erythema absent, bilateral. Distal Rubor absent bilateral.  Temperature within normal limits bilateral. Hyperpigmentation present bilateral. Atrophic skin yes. Neurological: Sensation intact to light touch to level of digits, bilateral.  Protective sensation intact 10/10 sites via 5.07/10g [place] Monofilament, bilateral.  negative Tinel's, bilateral.  negative Valleix sign, bilateral.  Vibratory intact bilateral.  Reflexes Decreased bilateral.  Paresthesias positive. Dysthesias negative. Sharp/dull intact bilateral.    Musculoskeletal: Muscle strength 5/5, bilateral.  Pain absent upon palpation bilateral. Normal medial longitudinal arch, bilateral.  Ankle ROM decresed,bilateral.  1st MPJ ROM within normal limits, bilateral.  Dorsally contracted digits absent. No other foot deformities. Integument: Open lesion absent, Bilateral.  Interdigital maceration absent to web spaces,absent Bilateral.  Nails left 1, 2, 3, 4, 5 and right 1, 2, 3, 4, 5 thickened, dystrophic and crumbly, discolored with subungual debris. Fissures absent, Bilateral. Hyperkeratotic tissue is absent. Seen sub R 5th met head    Assessment:    Diagnosis Orders   1. DM (diabetes mellitus), type 2 with neurological complications ([address]     2.  Dermatophytosis of nail         Plan:  Diabetic foot education and exam.  All nails as mentioned above debrided in length and thickness. Patient advised OTC methods for treatment of the mycotic nails. Patient will follow up in 10 weeks.

## 2021-02-17 NOTE — PROGRESS NOTES
Foot Care Worksheet  PCP: Amelia Ponce DO  Last visit: 6 / 11 / 2020 per patient    Nail description:  Thick , Yellow , Crumbly , Marked limitation of ambulation     Pain resulting from thickened and dystrophy of nail plate No    Nails involved  Right   1, 2, 3, 4, 5  (T5-T9)  Left     1, 2, 3, 4, 5  (TA-T4)    Q7 1 Class A Finding - Non traumatic amputation of foot No    Q8 2 Class B Findings - Absent DP pulse No, Absent PT pulse No, Advanced tropic changes (3 required) Yes    Decrease hair growth Yes, Nail changes/thickening Yes, Pigmented changes/discoloration Yes, Skin texture (thin, shiny) Yes, Skin color (rubor/redness) No    Q9 1 Class B and 2 Class C Findings  Claudication No, Temperature change Yes, Paresthesia Yes a, Burning No, Edema Yes

## 2021-05-05 ENCOUNTER — OFFICE VISIT (OUTPATIENT)
Dept: PODIATRY | Age: 76
End: 2021-05-05
Payer: COMMERCIAL

## 2021-05-05 VITALS — HEART RATE: 88 BPM | DIASTOLIC BLOOD PRESSURE: 80 MMHG | SYSTOLIC BLOOD PRESSURE: 128 MMHG | RESPIRATION RATE: 20 BRPM

## 2021-05-05 DIAGNOSIS — B35.1 DERMATOPHYTOSIS OF NAIL: ICD-10-CM

## 2021-05-05 DIAGNOSIS — E11.49 DM (DIABETES MELLITUS), TYPE 2 WITH NEUROLOGICAL COMPLICATIONS (HCC): Primary | ICD-10-CM

## 2021-05-05 PROCEDURE — 11721 DEBRIDE NAIL 6 OR MORE: CPT | Performed by: PODIATRIST

## 2021-05-05 PROCEDURE — 99999 PR OFFICE/OUTPT VISIT,PROCEDURE ONLY: CPT | Performed by: PODIATRIST

## 2021-05-05 NOTE — PROGRESS NOTES
Foot Care Worksheet  PCP: Jeanette Dash DO/ At [facility]  Last visit: 04 / 19 / 2021    Nail description:  Thick , Yellow , Crumbly , Marked limitation of ambulation     Pain resulting from thickened and dystrophy of nail plate No    Nails involved  Right   1, 2, 3, 4, 5  (T5-T9)  Left     1, 2, 3, 4, 5  (TA-T4)    Q7 1 Class A Finding - Non traumatic amputation of foot No    Q8 2 Class B Findings - Absent DP pulse No, Absent PT pulse No, Advanced tropic changes (3 required) Yes    Decrease hair growth Yes, Nail changes/thickening Yes, Pigmented changes/discoloration Yes, Skin texture (thin, shiny) Yes, Skin color (rubor/redness) No    Q9 1 Class B and 2 Class C Findings  Claudication No, Temperature change Yes, Paresthesia Yes a, Burning No, Edema Yes

## 2021-05-05 NOTE — PROGRESS NOTES
Subjective:  Patient presents to [facility] today for routine diabetic foot care. Patient's diabetic control has been not changed. No n/v/f/c/d. Allergies   Allergen Reactions    Bupropion      Other reaction(s): Other See Comments  unknown    Heparin      Other reaction(s): AOF    Menadione      Is able to take the pill form of vitamin K    Phytonadione     Vitamin K     Vitamin K And Related Hives     Is able to take the pill form of vitamin K    Vitamin K1     Diphenhydramine Rash, Other (See Comments) and Hives     Pt had difficulty breathing when he was given benadryl and heparin IV, he has no problems with oral benadryl. When combined with heparin       Past Medical History:   Diagnosis Date    Cancer ([facility] [address]     skin    Chronic kidney disease     Diabetes mellitus ([facility] [address]     [facility] of [facility]    Hyperlipidemia     Hypertension     Neuropathy     Stroke ([facility] [address] 02/02/2020    Thyroid disease     Type 2 diabetes mellitus without complication (HCC)     UTI (urinary tract infection)        Prior to Admission medications    Medication Sig Start Date End Date Taking?  Authorizing Provider   Diabetic Shoe MISC by Does not apply route Dispense dm shoe and insoles  DM (diabetes mellitus), type 2 with neurological complications (HCC)  (primary encounter diagnosis)  Dermatophytosis of nail  Equinus deformity of both feet  Hammer toe, unspecified laterality 12/2/20  Yes Ismael Nuñez DPM   cyanocobalamin 100 MCG tablet Take 100 mcg by mouth daily   Yes Historical Provider, MD   aspirin 325 MG tablet Take 325 mg by mouth daily   Yes Historical Provider, MD   cephALEXin (KEFLEX) 500 MG capsule  12/20/18  Yes Historical Provider, MD   tacrolimus (PROGRAF) 1 MG capsule  12/4/18  Yes Historical Provider, MD   doxycycline hyclate (VIBRAMYCIN) 100 MG capsule Take 100 mg by mouth 2 times daily   Yes Historical Provider, MD   tamsulosin (FLOMAX) 0.4 MG capsule Take 0.4 mg by mouth daily   Yes Historical Provider, MD   gabapentin (NEURONTIN) 100 MG capsule Take 100 mg by mouth 3 times daily   Yes Historical Provider, MD   Compression Stockings MISC by Does not apply route   Yes Historical Provider, MD   calcitRIOL (ROCALTROL) 0.25 MCG capsule Take 0.25 mcg by mouth daily. Yes Historical Provider, MD   fenofibrate (TRICOR) 145 MG tablet Take 145 mg by mouth daily. Yes Historical Provider, MD   Vitamin D (CHOLECALCIFEROL) 1000 UNITS CAPS capsule Take 1,000 Units by mouth daily. Yes Historical Provider, MD   sirolimus (RAPAMUNE) 1 MG tablet Take 2 mg by mouth daily. Yes Historical Provider, MD   MYCOPHENOLATE MOFETIL PO Take  by mouth. Indications: 1000mg - BID   Yes Historical Provider, MD   levothyroxine (SYNTHROID) 50 MCG tablet Take 50 mcg by mouth Daily. Yes Historical Provider, MD   diphenoxylate-atropine (LOMOTIL) 2.5-0.025 MG per tablet Take 1 tablet by mouth 4 times daily as needed for Diarrhea. Yes Historical Provider, MD   amLODIPine (NORVASC) 5 MG tablet Take 5 mg by mouth daily. Yes Historical Provider, MD   furosemide (LASIX) 20 MG tablet Take 20 mg by mouth daily. Yes Historical Provider, MD   CALCIUM POLYCARBOPHIL PO Take 500 mg by mouth daily. Yes Historical Provider, MD   atorvastatin (LIPITOR) 40 MG tablet Take 40 mg by mouth daily. Yes Historical Provider, MD   HYDROcodone-acetaminophen (NORCO) 5-325 MG per tablet Take 1 tablet by mouth every 6 hours as needed for Pain (1 or 2 tabs). Yes Historical Provider, MD   Fish Oil OIL by Does not apply route. Yes Historical Provider, MD   Omega-3 Fatty Acids (FISH OIL PEARLS PO) Take  by mouth 2 times daily. Yes Historical Provider, MD   Pantoprazole Sodium (PROTONIX) 40 MG PACK packet Take 40 mg by mouth daily. Yes Historical Provider, MD   insulin regular (HUMULIN R;NOVOLIN R) 100 UNIT/ML injection Inject  into the skin See Admin Instructions.  Indications: sliding scale   Yes Historical Provider, MD   insulin NPH (HUMULIN N;NOVOLIN N) 100 UNIT/ML injection vial Inject  into the skin 2 times daily (before meals). Indications: 60units in the am and 50units in the pm   Yes Historical Provider, MD       Social History     Tobacco Use    Smoking status: Never Smoker    Smokeless tobacco: Never Used   Substance Use Topics    Alcohol use: No     Review of Systems: All 12 systems reviewed and pertinent positives noted above. Objective:  Vascular: DP and PT pulses palpable 2/4, bilateral.  CFT <3 seconds, bilateral.  Hair growth present to the level of the digits, bilateral.  Edema present, bilateral.  Varicosities present, bilateral. Erythema absent, bilateral. Distal Rubor absent bilateral.  Temperature within normal limits bilateral. Hyperpigmentation present bilateral. Atrophic skin yes. Neurological: Sensation intact to light touch to level of digits, bilateral.  Protective sensation intact 10/10 sites via 5.07/10g [place]-Loy Monofilament, bilateral.  negative Tinel's, bilateral.  negative Valleix sign, bilateral.  Vibratory intact bilateral.  Reflexes Decreased bilateral.  Paresthesias positive. Dysthesias negative. Sharp/dull intact bilateral.    Musculoskeletal: Muscle strength 5/5, bilateral.  Pain absent upon palpation bilateral. Normal medial longitudinal arch, bilateral.  Ankle ROM decresed,bilateral.  1st MPJ ROM within normal limits, bilateral.  Dorsally contracted digits absent. No other foot deformities. Integument: Open lesion absent, Bilateral.  Interdigital maceration absent to web spaces,absent Bilateral.  Nails left 1, 2, 3, 4, 5 and right 1, 2, 3, 4, 5 thickened, dystrophic and crumbly, discolored with subungual debris. Fissures absent, Bilateral. Hyperkeratotic tissue is absent. Seen sub R 5th met head    Assessment:    Diagnosis Orders   1. DM (diabetes mellitus), type 2 with neurological complications ([facility] [address]     2.  Dermatophytosis of nail         Plan:  Diabetic foot education and exam.  All nails as mentioned above debrided in length and thickness. Patient advised OTC methods for treatment of the mycotic nails. Patient will follow up in 10 weeks.

## 2021-07-14 ENCOUNTER — OFFICE VISIT (OUTPATIENT)
Dept: PODIATRY | Age: 76
End: 2021-07-14
Payer: MEDICARE

## 2021-07-14 VITALS
RESPIRATION RATE: 24 BRPM | BODY MASS INDEX: 37.65 KG/M2 | SYSTOLIC BLOOD PRESSURE: 138 MMHG | DIASTOLIC BLOOD PRESSURE: 64 MMHG | HEART RATE: 72 BPM | WEIGHT: 277.6 LBS

## 2021-07-14 DIAGNOSIS — B35.1 DERMATOPHYTOSIS OF NAIL: ICD-10-CM

## 2021-07-14 DIAGNOSIS — L84 CORNS AND CALLOSITIES: ICD-10-CM

## 2021-07-14 DIAGNOSIS — E11.49 DM (DIABETES MELLITUS), TYPE 2 WITH NEUROLOGICAL COMPLICATIONS (HCC): Primary | ICD-10-CM

## 2021-07-14 PROCEDURE — 11721 DEBRIDE NAIL 6 OR MORE: CPT | Performed by: PODIATRIST

## 2021-07-14 PROCEDURE — 99999 PR OFFICE/OUTPT VISIT,PROCEDURE ONLY: CPT | Performed by: PODIATRIST

## 2021-07-14 NOTE — PROGRESS NOTES
Foot Care Worksheet  PCP: Bella Shaikh DO/ At [id number] [facility]  Last visit: 06 / 08 / 2021 per patient    Nail description:  Thick , Yellow , Crumbly , Marked limitation of ambulation     Pain resulting from thickened and dystrophy of nail plate No    Nails involved  Right   1, 2, 3, 4, 5  (T5-T9)  Left     1, 2, 3, 4, 5  (TA-T4)    Q7 1 Class A Finding - Non traumatic amputation of foot No    Q8 2 Class B Findings - Absent DP pulse No, Absent PT pulse No, Advanced tropic changes (3 required) Yes    Decrease hair growth Yes, Nail changes/thickening Yes, Pigmented changes/discoloration Yes, Skin texture (thin, shiny) Yes, Skin color (rubor/redness) No    Q9 1 Class B and 2 Class C Findings  Claudication No, Temperature change Yes, Paresthesia Yes a, Burning No, Edema Yes

## 2021-07-14 NOTE — PROGRESS NOTES
Subjective:  Patient presents to [facility] today for routine diabetic foot care. Patient's diabetic control has been not changed. No n/v/f/c/d. Allergies   Allergen Reactions    Bupropion      Other reaction(s): Other See Comments  unknown    Heparin      Other reaction(s): AOF    Menadione      Is able to take the pill form of vitamin K    Phytonadione     Vitamin K     Vitamin K And Related Hives     Is able to take the pill form of vitamin K    Vitamin K1     Diphenhydramine Rash, Other (See Comments) and Hives     Pt had difficulty breathing when he was given benadryl and heparin IV, he has no problems with oral benadryl. When combined with heparin       Past Medical History:   Diagnosis Date    Cancer ([facility] [address]     skin    Chronic kidney disease     Diabetes mellitus ([facility] [address]     [facility] of [facility]    Hyperlipidemia     Hypertension     Neuropathy     Stroke ([facility] 02/02/2020    Thyroid disease     Type 2 diabetes mellitus without complication (HCC)     UTI (urinary tract infection)        Prior to Admission medications    Medication Sig Start Date End Date Taking?  Authorizing Provider   Diabetic Shoe MISC by Does not apply route Dispense dm shoe and insoles  DM (diabetes mellitus), type 2 with neurological complications (HCC)  (primary encounter diagnosis)  Dermatophytosis of nail  Equinus deformity of both feet  Hammer toe, unspecified laterality 12/2/20  Yes Jonathan Cedeno DPM   cyanocobalamin 100 MCG tablet Take 100 mcg by mouth daily   Yes Historical Provider, MD   aspirin 325 MG tablet Take 325 mg by mouth daily   Yes Historical Provider, MD   cephALEXin (KEFLEX) 500 MG capsule  12/20/18  Yes Historical Provider, MD   tacrolimus (PROGRAF) 1 MG capsule  12/4/18  Yes Historical Provider, MD   doxycycline hyclate (VIBRAMYCIN) 100 MG capsule Take 100 mg by mouth 2 times daily   Yes Historical Provider, MD   tamsulosin (FLOMAX) 0.4 MG capsule Take 0.4 mg by mouth daily   Yes Historical Provider, MD   gabapentin (NEURONTIN) 100 MG capsule Take 100 mg by mouth 3 times daily   Yes Historical Provider, MD   Compression Stockings MISC by Does not apply route   Yes Historical Provider, MD   calcitRIOL (ROCALTROL) 0.25 MCG capsule Take 0.25 mcg by mouth daily. Yes Historical Provider, MD   fenofibrate (TRICOR) 145 MG tablet Take 145 mg by mouth daily. Yes Historical Provider, MD   Vitamin D (CHOLECALCIFEROL) 1000 UNITS CAPS capsule Take 1,000 Units by mouth daily. Yes Historical Provider, MD   sirolimus (RAPAMUNE) 1 MG tablet Take 2 mg by mouth daily. Yes Historical Provider, MD   MYCOPHENOLATE MOFETIL PO Take  by mouth. Indications: 1000mg - BID   Yes Historical Provider, MD   levothyroxine (SYNTHROID) 50 MCG tablet Take 50 mcg by mouth Daily. Yes Historical Provider, MD   diphenoxylate-atropine (LOMOTIL) 2.5-0.025 MG per tablet Take 1 tablet by mouth 4 times daily as needed for Diarrhea. Yes Historical Provider, MD   amLODIPine (NORVASC) 5 MG tablet Take 5 mg by mouth daily. Yes Historical Provider, MD   furosemide (LASIX) 20 MG tablet Take 20 mg by mouth daily. Yes Historical Provider, MD   CALCIUM POLYCARBOPHIL PO Take 500 mg by mouth daily. Yes Historical Provider, MD   atorvastatin (LIPITOR) 40 MG tablet Take 40 mg by mouth daily. Yes Historical Provider, MD   HYDROcodone-acetaminophen (NORCO) 5-325 MG per tablet Take 1 tablet by mouth every 6 hours as needed for Pain (1 or 2 tabs). Yes Historical Provider, MD   Fish Oil OIL by Does not apply route. Yes Historical Provider, MD   Omega-3 Fatty Acids (FISH OIL PEARLS PO) Take  by mouth 2 times daily. Yes Historical Provider, MD   Pantoprazole Sodium (PROTONIX) 40 MG PACK packet Take 40 mg by mouth daily. Yes Historical Provider, MD   insulin regular (HUMULIN R;NOVOLIN R) 100 UNIT/ML injection Inject  into the skin See Admin Instructions.  Indications: sliding scale   Yes Historical Provider, MD   insulin NPH (HUMULIN N;NOVOLIN N) 100 UNIT/ML injection vial Inject  into the skin 2 times daily (before meals). Indications: 60units in the am and 50units in the pm   Yes Historical Provider, MD       Social History     Tobacco Use    Smoking status: Never Smoker    Smokeless tobacco: Never Used   Substance Use Topics    Alcohol use: No     Review of Systems: All 12 systems reviewed and pertinent positives noted above. Objective:  Vascular: DP and PT pulses palpable 2/4, bilateral.  CFT <3 seconds, bilateral.  Hair growth present to the level of the digits, bilateral.  Edema present, bilateral.  Varicosities present, bilateral. Erythema absent, bilateral. Distal Rubor absent bilateral.  Temperature within normal limits bilateral. Hyperpigmentation present bilateral. Atrophic skin yes. Neurological: Sensation intact to light touch to level of digits, bilateral.  Protective sensation intact 10/10 sites via 5.07/10g [place]-Loy Monofilament, bilateral.  negative Tinel's, bilateral.  negative Valleix sign, bilateral.  Vibratory intact bilateral.  Reflexes Decreased bilateral.  Paresthesias positive. Dysthesias negative. Sharp/dull intact bilateral.    Musculoskeletal: Muscle strength 5/5, bilateral.  Pain absent upon palpation bilateral. Normal medial longitudinal arch, bilateral.  Ankle ROM decresed,bilateral.  1st MPJ ROM within normal limits, bilateral.  Dorsally contracted digits absent. No other foot deformities. Integument: Open lesion absent, Bilateral.  Interdigital maceration absent to web spaces,absent Bilateral.  Nails left 1, 2, 3, 4, 5 and right 1, 2, 3, 4, 5 thickened, dystrophic and crumbly, discolored with subungual debris. Fissures absent, Bilateral. Hyperkeratotic tissue is absent. Seen sub R 5th met head    Assessment:    Diagnosis Orders   1. DM (diabetes mellitus), type 2 with neurological complications ([facility] [address]     2.  Dermatophytosis of

## 2021-09-29 ENCOUNTER — OFFICE VISIT (OUTPATIENT)
Dept: PODIATRY | Age: 76
End: 2021-09-29
Payer: MEDICARE

## 2021-09-29 VITALS
SYSTOLIC BLOOD PRESSURE: 132 MMHG | BODY MASS INDEX: 37.54 KG/M2 | DIASTOLIC BLOOD PRESSURE: 74 MMHG | WEIGHT: 276.8 LBS | RESPIRATION RATE: 24 BRPM | HEART RATE: 88 BPM

## 2021-09-29 DIAGNOSIS — B35.1 DERMATOPHYTOSIS OF NAIL: ICD-10-CM

## 2021-09-29 DIAGNOSIS — E11.49 DM (DIABETES MELLITUS), TYPE 2 WITH NEUROLOGICAL COMPLICATIONS (HCC): Primary | ICD-10-CM

## 2021-09-29 DIAGNOSIS — L84 CORNS AND CALLOSITIES: ICD-10-CM

## 2021-09-29 PROCEDURE — 11721 DEBRIDE NAIL 6 OR MORE: CPT | Performed by: PODIATRIST

## 2021-09-29 PROCEDURE — 99999 PR OFFICE/OUTPT VISIT,PROCEDURE ONLY: CPT | Performed by: PODIATRIST

## 2021-09-29 RX ORDER — EVOLOCUMAB 140 MG/ML
INJECTION, SOLUTION SUBCUTANEOUS
COMMUNITY
Start: 2021-09-16

## 2021-09-29 NOTE — PROGRESS NOTES
Subjective:  Patient presents to [facility] today for routine diabetic foot care. Patient's diabetic control has been not changed. No n/v/f/c/d. Allergies   Allergen Reactions    Bupropion      Other reaction(s): Other See Comments  unknown    Heparin      Other reaction(s): AOF  Other reaction(s): AOF    Menadione      Is able to take the pill form of vitamin K    Other     Phytonadione     Vitamin K     Vitamin K And Related Hives     Is able to take the pill form of vitamin K    Vitamin K1     Diphenhydramine Rash, Other (See Comments) and Hives     Pt had difficulty breathing when he was given benadryl and heparin IV, he has no problems with oral benadryl. 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  Authorizing Provider   Evolocumab (Darrick Patton) 699 MG/ [address] TWO WEEKS 9/16/21  Yes Historical Provider, MD   Diabetic Shoe MISC by Does not apply route Dispense dm shoe and insoles  DM (diabetes mellitus), type 2 with neurological complications ([address]  (primary encounter diagnosis)  Dermatophytosis of nail  Equinus deformity of both feet  Hammer toe, unspecified laterality 12/2/20  Yes Angelica Rose DPM   cyanocobalamin 100 MCG tablet Take 100 mcg by mouth daily   Yes Historical Provider, MD   aspirin 325 MG tablet Take 325 mg by mouth daily   Yes Historical Provider, MD   cephALEXin (KEFLEX) 500 MG capsule  12/20/18  Yes Historical Provider, MD   tacrolimus (PROGRAF) 1 MG capsule  12/4/18 Yes Historical Provider, MD   doxycycline hyclate (VIBRAMYCIN) 100 MG capsule Take 100 mg by mouth 2 times daily   Yes Historical Provider, MD   tamsulosin (FLOMAX) 0.4 MG capsule Take 0.4 mg by mouth daily   Yes Historical Provider, MD   gabapentin (NEURONTIN) 100 MG capsule Take 100 mg by mouth 3 times daily   Yes Historical Provider, MD   Compression Stockings MISC by Does not apply route   Yes Historical Provider, MD   calcitRIOL (ROCALTROL) 0.25 MCG capsule Take 0.25 mcg by mouth daily. Yes Historical Provider, MD   Vitamin D (CHOLECALCIFEROL) 1000 UNITS CAPS capsule Take 1,000 Units by mouth daily. Yes Historical Provider, MD   sirolimus (RAPAMUNE) 1 MG tablet Take 2 mg by mouth daily. Yes Historical Provider, MD   MYCOPHENOLATE MOFETIL PO Take  by mouth. Indications: 1000mg - BID   Yes Historical Provider, MD   levothyroxine (SYNTHROID) 50 MCG tablet Take 50 mcg by mouth Daily. Yes Historical Provider, MD   diphenoxylate-atropine (LOMOTIL) 2.5-0.025 MG per tablet Take 1 tablet by mouth 4 times daily as needed for Diarrhea. Yes Historical Provider, MD   amLODIPine (NORVASC) 5 MG tablet Take 5 mg by mouth daily. Yes Historical Provider, MD   furosemide (LASIX) 20 MG tablet Take 20 mg by mouth daily. Yes Historical Provider, MD   CALCIUM POLYCARBOPHIL PO Take 500 mg by mouth daily. Yes Historical Provider, MD   atorvastatin (LIPITOR) 40 MG tablet Take 40 mg by mouth daily. Yes Historical Provider, MD   HYDROcodone-acetaminophen (NORCO) 5-325 MG per tablet Take 1 tablet by mouth every 6 hours as needed for Pain (1 or 2 tabs). Yes Historical Provider, MD   Omega-3 Fatty Acids (FISH OIL PEARLS PO) Take  by mouth 2 times daily. Yes Historical Provider, MD   Pantoprazole Sodium (PROTONIX) 40 MG PACK packet Take 40 mg by mouth daily. Yes Historical Provider, MD   insulin regular (HUMULIN R;NOVOLIN R) 100 UNIT/ML injection Inject  into the skin See Admin Instructions.  Indications: sliding scale Yes Historical Provider, MD   insulin NPH (HUMULIN N;NOVOLIN N) 100 UNIT/ML injection vial Inject  into the skin 2 times daily (before meals). Indications: 60units in the am and 50units in the pm   Yes Historical Provider, MD       Social History     Tobacco Use    Smoking status: Never Smoker    Smokeless tobacco: Never Used   Substance Use Topics    Alcohol use: No     Review of Systems: All 12 systems reviewed and pertinent positives noted above. Objective:  Vascular: DP and PT pulses palpable 2/4, bilateral.  CFT <3 seconds, bilateral.  Hair growth present to the level of the digits, bilateral.  Edema present, bilateral.  Varicosities present, bilateral. Erythema absent, bilateral. Distal Rubor absent bilateral.  Temperature within normal limits bilateral. Hyperpigmentation present bilateral. Atrophic skin yes. Neurological: Sensation intact to light touch to level of digits, bilateral.  Protective sensation intact 10/10 sites via 5.07/10g [place]-Loy Monofilament, bilateral.  negative Tinel's, bilateral.  negative Valleix sign, bilateral.  Vibratory intact bilateral.  Reflexes Decreased bilateral.  Paresthesias positive. Dysthesias negative. Sharp/dull intact bilateral.    Musculoskeletal: Muscle strength 5/5, bilateral.  Pain absent upon palpation bilateral. Normal medial longitudinal arch, bilateral.  Ankle ROM decresed,bilateral.  1st MPJ ROM within normal limits, bilateral.  Dorsally contracted digits absent. No other foot deformities. Integument: Open lesion absent, Bilateral.  Interdigital maceration absent to web spaces,absent Bilateral.  Nails left 1, 2, 3, 4, 5 and right 1, 2, 3, 4, 5 thickened, dystrophic and crumbly, discolored with subungual debris. Fissures absent, Bilateral. Hyperkeratotic tissue is absent. Seen sub R 5th met head    Assessment:    Diagnosis Orders   1. DM (diabetes mellitus), type 2 with neurological complications ([address]     2. Dermatophytosis of nail     3. Corns and callosities         Plan:  Diabetic foot education and exam.  All nails as mentioned above debrided in length and thickness. Patient advised OTC methods for treatment of the mycotic nails. Patient will follow up in 10 weeks.

## 2021-12-08 ENCOUNTER — OFFICE VISIT (OUTPATIENT)
Dept: PODIATRY | Age: 76
End: 2021-12-08
Payer: MEDICARE

## 2021-12-08 VITALS
DIASTOLIC BLOOD PRESSURE: 76 MMHG | BODY MASS INDEX: 36.86 KG/M2 | SYSTOLIC BLOOD PRESSURE: 138 MMHG | HEART RATE: 80 BPM | WEIGHT: 271.8 LBS | RESPIRATION RATE: 24 BRPM

## 2021-12-08 DIAGNOSIS — B35.1 DERMATOPHYTOSIS OF NAIL: ICD-10-CM

## 2021-12-08 DIAGNOSIS — E11.49 DM (DIABETES MELLITUS), TYPE 2 WITH NEUROLOGICAL COMPLICATIONS (HCC): Primary | ICD-10-CM

## 2021-12-08 PROCEDURE — 11721 DEBRIDE NAIL 6 OR MORE: CPT | Performed by: PODIATRIST

## 2021-12-08 PROCEDURE — 99999 PR OFFICE/OUTPT VISIT,PROCEDURE ONLY: CPT | Performed by: PODIATRIST

## 2021-12-08 NOTE — PROGRESS NOTES
Yes Historical Provider, MD   doxycycline hyclate (VIBRAMYCIN) 100 MG capsule Take 100 mg by mouth 2 times daily   Yes Historical Provider, MD   tamsulosin (FLOMAX) 0.4 MG capsule Take 0.4 mg by mouth daily   Yes Historical Provider, MD   gabapentin (NEURONTIN) 100 MG capsule Take 100 mg by mouth 3 times daily   Yes Historical Provider, MD   Compression Stockings MISC by Does not apply route   Yes Historical Provider, MD   calcitRIOL (ROCALTROL) 0.25 MCG capsule Take 0.25 mcg by mouth daily. Yes Historical Provider, MD   Vitamin D (CHOLECALCIFEROL) 1000 UNITS CAPS capsule Take 1,000 Units by mouth daily. Yes Historical Provider, MD   sirolimus (RAPAMUNE) 1 MG tablet Take 2 mg by mouth daily. Yes Historical Provider, MD   MYCOPHENOLATE MOFETIL PO Take  by mouth. Indications: 1000mg - BID   Yes Historical Provider, MD   levothyroxine (SYNTHROID) 50 MCG tablet Take 50 mcg by mouth Daily. Yes Historical Provider, MD   diphenoxylate-atropine (LOMOTIL) 2.5-0.025 MG per tablet Take 1 tablet by mouth 4 times daily as needed for Diarrhea. Yes Historical Provider, MD   amLODIPine (NORVASC) 5 MG tablet Take 5 mg by mouth daily. Yes Historical Provider, MD   furosemide (LASIX) 20 MG tablet Take 20 mg by mouth daily. Yes Historical Provider, MD   CALCIUM POLYCARBOPHIL PO Take 500 mg by mouth daily. Yes Historical Provider, MD   atorvastatin (LIPITOR) 40 MG tablet Take 40 mg by mouth daily. Yes Historical Provider, MD   HYDROcodone-acetaminophen (NORCO) 5-325 MG per tablet Take 1 tablet by mouth every 6 hours as needed for Pain (1 or 2 tabs). Yes Historical Provider, MD   Omega-3 Fatty Acids (FISH OIL PEARLS PO) Take  by mouth 2 times daily. Yes Historical Provider, MD   Pantoprazole Sodium (PROTONIX) 40 MG PACK packet Take 40 mg by mouth daily. Yes Historical Provider, MD   insulin regular (HUMULIN R;NOVOLIN R) 100 UNIT/ML injection Inject  into the skin See Admin Instructions.  Indications: sliding scale Yes Historical Provider, MD   insulin NPH (HUMULIN N;NOVOLIN N) 100 UNIT/ML injection vial Inject  into the skin 2 times daily (before meals). Indications: 60units in the am and 50units in the pm   Yes Historical Provider, MD       Social History     Tobacco Use    Smoking status: Never Smoker    Smokeless tobacco: Never Used   Substance Use Topics    Alcohol use: No     Review of Systems: All 12 systems reviewed and pertinent positives noted above. Objective:  Vascular: DP and PT pulses palpable 2/4, bilateral.  CFT <3 seconds, bilateral.  Hair growth present to the level of the digits, bilateral.  Edema present, bilateral.  Varicosities present, bilateral. Erythema absent, bilateral. Distal Rubor absent bilateral.  Temperature within normal limits bilateral. Hyperpigmentation present bilateral. Atrophic skin yes. Neurological: Sensation intact to light touch to level of digits, bilateral.  Protective sensation intact 10/10 sites via 5.07/10g [place]-Loy Monofilament, bilateral.  negative Tinel's, bilateral.  negative Valleix sign, bilateral.  Vibratory intact bilateral.  Reflexes Decreased bilateral.  Paresthesias positive. Dysthesias negative. Sharp/dull intact bilateral.    Musculoskeletal: Muscle strength 5/5, bilateral.  Pain absent upon palpation bilateral. Normal medial longitudinal arch, bilateral.  Ankle ROM decresed,bilateral.  1st MPJ ROM within normal limits, bilateral.  Dorsally contracted digits absent. No other foot deformities. Integument: Open lesion absent, Bilateral.  Interdigital maceration absent to web spaces,absent Bilateral.  Nails left 1, 2, 3, 4, 5 and right 1, 2, 3, 4, 5 thickened, dystrophic and crumbly, discolored with subungual debris. Fissures absent, Bilateral. Hyperkeratotic tissue is absent. Seen sub R 5th met head    Assessment:    Diagnosis Orders   1. DM (diabetes mellitus), type 2 with neurological complications ([facility]     2.  Dermatophytosis of nail Plan:  Diabetic foot education and exam.  All nails as mentioned above debrided in length and thickness. Patient advised OTC methods for treatment of the mycotic nails. Patient will follow up in 10 weeks.

## 2021-12-08 NOTE — PROGRESS NOTES
Foot Care Worksheet  PCP: Will Gentile  Last visit: 11 / 17/ 2021 per patient    Nail description:  Thick , Yellow , Crumbly , Marked limitation of ambulation     Pain resulting from thickened and dystrophy of nail plate No    Nails involved  Right   1, 2, 3, 4, 5  (T5-T9)  Left     1, 2, 3, 4, 5  (TA-T4)    Q7 1 Class A Finding - Non traumatic amputation of foot No    Q8 2 Class B Findings - Absent DP pulse No, Absent PT pulse No, Advanced tropic changes (3 required) Yes    Decrease hair growth Yes, Nail changes/thickening Yes, Pigmented changes/discoloration Yes, Skin texture (thin, shiny) Yes, Skin color (rubor/redness) No    Q9 1 Class B and 2 Class C Findings  Claudication No, Temperature change Yes, Paresthesia Yes a, Burning No, Edema Yes

## 2022-02-16 ENCOUNTER — OFFICE VISIT (OUTPATIENT)
Dept: PODIATRY | Age: 77
End: 2022-02-16
Payer: MEDICARE

## 2022-02-16 VITALS — DIASTOLIC BLOOD PRESSURE: 74 MMHG | SYSTOLIC BLOOD PRESSURE: 130 MMHG | HEART RATE: 76 BPM | RESPIRATION RATE: 20 BRPM

## 2022-02-16 DIAGNOSIS — B35.1 DERMATOPHYTOSIS OF NAIL: ICD-10-CM

## 2022-02-16 DIAGNOSIS — L84 CORNS AND CALLOSITIES: ICD-10-CM

## 2022-02-16 DIAGNOSIS — E11.49 DM (DIABETES MELLITUS), TYPE 2 WITH NEUROLOGICAL COMPLICATIONS (HCC): Primary | ICD-10-CM

## 2022-02-16 PROCEDURE — 99999 PR OFFICE/OUTPT VISIT,PROCEDURE ONLY: CPT | Performed by: PODIATRIST

## 2022-02-16 PROCEDURE — 11055 PARING/CUTG B9 HYPRKER LES 1: CPT | Performed by: PODIATRIST

## 2022-02-16 PROCEDURE — 11721 DEBRIDE NAIL 6 OR MORE: CPT | Performed by: PODIATRIST

## 2022-02-16 NOTE — PROGRESS NOTES
Foot Care Worksheet  PCP: Maeve Vernon MD  Last visit: 02 / 11 / 2022 per patient    Nail description:  Thick , Yellow , Crumbly , Marked limitation of ambulation     Pain resulting from thickened and dystrophy of nail plate No    Nails involved  Right   1, 2, 3, 4, 5  (T5-T9)  Left     1, 2, 3, 4, 5  (TA-T4)    Q7 1 Class A Finding - Non traumatic amputation of foot No    Q8 2 Class B Findings - Absent DP pulse No, Absent PT pulse No, Advanced tropic changes (3 required) Yes    Decrease hair growth Yes, Nail changes/thickening Yes, Pigmented changes/discoloration Yes, Skin texture (thin, shiny) Yes, Skin color (rubor/redness) No    Q9 1 Class B and 2 Class C Findings  Claudication No, Temperature change Yes, Paresthesia Yes a, Burning No, Edema Yes

## 2022-02-16 NOTE — PROGRESS NOTES
Subjective:  Patient presents to [facility] today for routine diabetic foot care. Patient's diabetic control has been not changed. No n/v/f/c/d. Allergies   Allergen Reactions    Bupropion      Other reaction(s): Other See Comments  unknown    Heparin      Other reaction(s): AOF  Other reaction(s): AOF    Menadione      Is able to take the pill form of vitamin K    Other     Phytonadione     Vitamin K     Vitamin K And Related Hives     Is able to take the pill form of vitamin K    Vitamin K1     Diphenhydramine Rash, Other (See Comments) and Hives     Pt had difficulty breathing when he was given benadryl and heparin IV, he has no problems with oral benadryl. When combined with heparin       Past Medical History:   Diagnosis Date    Cancer ([facility]     skin    Chronic kidney disease     Diabetes mellitus ([facility]     [facility] of [facility]    Hyperlipidemia     Hypertension     Neuropathy     Stroke ([facility] [facility] 02/02/2020    Thyroid disease     Type 2 diabetes mellitus without complication (HCC)     UTI (urinary tract infection)        Prior to Admission medications    Medication Sig Start Date End Date Taking?  Authorizing Provider   Evolocumab (Tatyana Sport) 867 MG/ [address] TWO WEEKS 9/16/21  Yes Historical Provider, MD   Diabetic Shoe MISC by Does not apply route Dispense dm shoe and insoles  DM (diabetes mellitus), type 2 with neurological complications ([facility] [facility]  (primary encounter diagnosis)  Dermatophytosis of nail  Equinus deformity of both feet  Hammer toe, unspecified laterality 12/2/20  Yes Kwan Figueroa DPM   cyanocobalamin 100 MCG tablet Take 100 mcg by mouth daily   Yes Historical Provider, MD   aspirin 325 MG tablet Take 325 mg by mouth daily   Yes Historical Provider, MD   cephALEXin (KEFLEX) 500 MG capsule  12/20/18  Yes Historical Provider, MD   tacrolimus (PROGRAF) 1 MG capsule  12/4/18 Yes Historical Provider, MD   doxycycline hyclate (VIBRAMYCIN) 100 MG capsule Take 100 mg by mouth 2 times daily   Yes Historical Provider, MD   tamsulosin (FLOMAX) 0.4 MG capsule Take 0.4 mg by mouth daily   Yes Historical Provider, MD   gabapentin (NEURONTIN) 100 MG capsule Take 100 mg by mouth 3 times daily   Yes Historical Provider, MD   Compression Stockings MISC by Does not apply route   Yes Historical Provider, MD   calcitRIOL (ROCALTROL) 0.25 MCG capsule Take 0.25 mcg by mouth daily. Yes Historical Provider, MD   Vitamin D (CHOLECALCIFEROL) 1000 UNITS CAPS capsule Take 1,000 Units by mouth daily. Yes Historical Provider, MD   sirolimus (RAPAMUNE) 1 MG tablet Take 2 mg by mouth daily. Yes Historical Provider, MD   MYCOPHENOLATE MOFETIL PO Take  by mouth. Indications: 1000mg - BID   Yes Historical Provider, MD   levothyroxine (SYNTHROID) 50 MCG tablet Take 50 mcg by mouth Daily. Yes Historical Provider, MD   diphenoxylate-atropine (LOMOTIL) 2.5-0.025 MG per tablet Take 1 tablet by mouth 4 times daily as needed for Diarrhea. Yes Historical Provider, MD   amLODIPine (NORVASC) 5 MG tablet Take 5 mg by mouth daily. Yes Historical Provider, MD   furosemide (LASIX) 20 MG tablet Take 20 mg by mouth daily. Yes Historical Provider, MD   CALCIUM POLYCARBOPHIL PO Take 500 mg by mouth daily. Yes Historical Provider, MD   atorvastatin (LIPITOR) 40 MG tablet Take 40 mg by mouth daily. Yes Historical Provider, MD   HYDROcodone-acetaminophen (NORCO) 5-325 MG per tablet Take 1 tablet by mouth every 6 hours as needed for Pain (1 or 2 tabs). Yes Historical Provider, MD   Omega-3 Fatty Acids (FISH OIL PEARLS PO) Take  by mouth 2 times daily. Yes Historical Provider, MD   Pantoprazole Sodium (PROTONIX) 40 MG PACK packet Take 40 mg by mouth daily. Yes Historical Provider, MD   insulin regular (HUMULIN R;NOVOLIN R) 100 UNIT/ML injection Inject  into the skin See Admin Instructions.  Indications: sliding scale Yes Historical Provider, MD   insulin NPH (HUMULIN N;NOVOLIN N) 100 UNIT/ML injection vial Inject  into the skin 2 times daily (before meals). Indications: 60units in the am and 50units in the pm   Yes Historical Provider, MD       Social History     Tobacco Use    Smoking status: Never Smoker    Smokeless tobacco: Never Used   Substance Use Topics    Alcohol use: No     Review of Systems: All 12 systems reviewed and pertinent positives noted above. Objective:  Vascular: DP and PT pulses palpable 2/4, bilateral.  CFT <3 seconds, bilateral.  Hair growth present to the level of the digits, bilateral.  Edema present, bilateral.  Varicosities present, bilateral. Erythema absent, bilateral. Distal Rubor absent bilateral.  Temperature within normal limits bilateral. Hyperpigmentation present bilateral. Atrophic skin yes. Neurological: Sensation intact to light touch to level of digits, bilateral.  Protective sensation intact 10/10 sites via 5.07/10g [place]-Loy Monofilament, bilateral.  negative Tinel's, bilateral.  negative Valleix sign, bilateral.  Vibratory intact bilateral.  Reflexes Decreased bilateral.  Paresthesias positive. Dysthesias negative. Sharp/dull intact bilateral.    Musculoskeletal: Muscle strength 5/5, bilateral.  Pain absent upon palpation bilateral. Normal medial longitudinal arch, bilateral.  Ankle ROM decresed,bilateral.  1st MPJ ROM within normal limits, bilateral.  Dorsally contracted digits absent. No other foot deformities. Integument: Open lesion absent, Bilateral.  Interdigital maceration absent to web spaces,absent Bilateral.  Nails left 1, 2, 3, 4, 5 and right 1, 2, 3, 4, 5 thickened, dystrophic and crumbly, discolored with subungual debris. Fissures absent, Bilateral. Hyperkeratotic tissue is present. Seen dorsal R hallux ipj. Assessment:    Diagnosis Orders   1. DM (diabetes mellitus), type 2 with neurological complications ([facility]     2. Dermatophytosis of nail     3. Corns and callosities         Plan:  Diabetic foot education and exam.  All nails as mentioned above debrided in length and thickness. Paring of 1 benign hyperkeratotic lesions (as listed above) took place with #10 blade or tissue nippers. Patient advised OTC methods for treatment of the mycotic nails. Patient will follow up in 10 weeks.

## 2022-04-27 ENCOUNTER — OFFICE VISIT (OUTPATIENT)
Dept: PODIATRY | Age: 77
End: 2022-04-27
Payer: MEDICARE

## 2022-04-27 VITALS — RESPIRATION RATE: 20 BRPM | HEART RATE: 72 BPM | DIASTOLIC BLOOD PRESSURE: 76 MMHG | SYSTOLIC BLOOD PRESSURE: 138 MMHG

## 2022-04-27 DIAGNOSIS — L84 CORNS AND CALLOSITIES: ICD-10-CM

## 2022-04-27 DIAGNOSIS — B35.1 DERMATOPHYTOSIS OF NAIL: ICD-10-CM

## 2022-04-27 DIAGNOSIS — E11.49 DM (DIABETES MELLITUS), TYPE 2 WITH NEUROLOGICAL COMPLICATIONS (HCC): Primary | ICD-10-CM

## 2022-04-27 PROCEDURE — 99999 PR OFFICE/OUTPT VISIT,PROCEDURE ONLY: CPT | Performed by: PODIATRIST

## 2022-04-27 PROCEDURE — 11721 DEBRIDE NAIL 6 OR MORE: CPT | Performed by: PODIATRIST

## 2022-04-27 RX ORDER — INSULIN LISPRO 100 [IU]/ML
INJECTION, SOLUTION INTRAVENOUS; SUBCUTANEOUS
COMMUNITY
Start: 2021-05-05 | End: 2022-04-27

## 2022-04-27 RX ORDER — INSULIN LISPRO 100 [IU]/ML
INJECTION, SOLUTION INTRAVENOUS; SUBCUTANEOUS
COMMUNITY

## 2022-04-27 NOTE — PROGRESS NOTES
Subjective:  Patient presents to [facility] today for routine diabetic foot care. Patient's diabetic control has been not changed. No n/v/f/c/d. Allergies   Allergen Reactions    Bupropion      Other reaction(s): Other See Comments  unknown    Heparin      Other reaction(s): AOF  Other reaction(s): AOF    Menadione      Is able to take the pill form of vitamin K    Other     Phytonadione     Vitamin K     Vitamin K And Related Hives     Is able to take the pill form of vitamin K    Vitamin K1     Diphenhydramine Rash, Other (See Comments) and Hives     Pt had difficulty breathing when he was given benadryl and heparin IV, he has no problems with oral benadryl. 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  Authorizing Provider   Multiple Vitamin (MULTI-VITAMIN PO) Take by mouth daily   Yes Historical Provider, MD   FERROUS SULFATE PO Take 5 g by mouth 2 times daily   Yes Historical Provider, MD   insulin glargine (LANTUS;BASAGLAR) 100 UNIT/ML injection pen Inject 70 Units into the skin daily   Yes Historical Provider, MD   mirabegron (MYRBETRIQ) 50 MG TB24 Take 50 mg by mouth daily 2/17/21  Yes Historical Provider, MD   insulin lispro (HUMALOG) 100 UNIT/ML SOLN injection vial Inject into the skin 3 times daily (before meals)   Yes Historical Provider, MD   Evolocumab (Norris Gallego) 738 MG/ML SOAJ INJECT 1 PEN SUBCUTANEOUSLY EVERY TWO WEEKS 9/16/21  Yes Historical Provider, MD   Diabetic Shoe MISC by Does not apply route Dispense dm shoe and insoles  DM (diabetes mellitus), type 2 with neurological complications ([facility] [address]  (primary encounter diagnosis)  Dermatophytosis of nail  Equinus deformity of both feet  Hammer toe, unspecified laterality 12/2/20  Yes Mohan Bravo DPM   cyanocobalamin 100 MCG tablet Take 100 mcg by mouth daily   Yes Historical Provider, MD   aspirin 325 MG tablet Take 325 mg by mouth daily   Yes Historical Provider, MD   cephALEXin (KEFLEX) 500 MG capsule  12/20/18  Yes Historical Provider, MD   tacrolimus (PROGRAF) 1 MG capsule  12/4/18  Yes Historical Provider, MD   doxycycline hyclate (VIBRAMYCIN) 100 MG capsule Take 100 mg by mouth 2 times daily   Yes Historical Provider, MD   tamsulosin (FLOMAX) 0.4 MG capsule Take 0.4 mg by mouth daily   Yes Historical Provider, MD   gabapentin (NEURONTIN) 100 MG capsule Take 100 mg by mouth 3 times daily   Yes Historical Provider, MD   Compression Stockings MISC by Does not apply route   Yes Historical Provider, MD   calcitRIOL (ROCALTROL) 0.25 MCG capsule Take 0.25 mcg by mouth daily. Yes Historical Provider, MD   Vitamin D (CHOLECALCIFEROL) 1000 UNITS CAPS capsule Take 1,000 Units by mouth daily. Yes Historical Provider, MD   sirolimus (RAPAMUNE) 1 MG tablet Take 2 mg by mouth daily. Yes Historical Provider, MD   MYCOPHENOLATE MOFETIL PO Take  by mouth. Indications: 1000mg - BID   Yes Historical Provider, MD   levothyroxine (SYNTHROID) 50 MCG tablet Take 50 mcg by mouth Daily. Yes Historical Provider, MD   diphenoxylate-atropine (LOMOTIL) 2.5-0.025 MG per tablet Take 1 tablet by mouth 4 times daily as needed for Diarrhea. Yes Historical Provider, MD   amLODIPine (NORVASC) 5 MG tablet Take 5 mg by mouth daily. Yes Historical Provider, MD   furosemide (LASIX) 20 MG tablet Take 20 mg by mouth daily. Yes Historical Provider, MD   CALCIUM POLYCARBOPHIL PO Take 500 mg by mouth daily.    Yes Historical Provider, MD   atorvastatin (LIPITOR) 40 MG tablet Take 40 mg by mouth daily. Yes Historical Provider, MD   HYDROcodone-acetaminophen (NORCO) 5-325 MG per tablet Take 1 tablet by mouth every 6 hours as needed for Pain (1 or 2 tabs). Yes Historical Provider, MD   Omega-3 Fatty Acids (FISH OIL PEARLS PO) Take  by mouth 2 times daily. Yes Historical Provider, MD   Pantoprazole Sodium (PROTONIX) 40 MG PACK packet Take 40 mg by mouth daily. Yes Historical Provider, MD   insulin regular (HUMULIN R;NOVOLIN R) 100 UNIT/ML injection Inject  into the skin See Admin Instructions. Indications: sliding scale  Patient not taking: Reported on 4/27/2022    Historical Provider, MD   insulin NPH (HUMULIN N;NOVOLIN N) 100 UNIT/ML injection vial Inject  into the skin 2 times daily (before meals). Indications: 60units in the am and 50units in the pm  Patient not taking: Reported on 4/27/2022    Historical Provider, MD       Social History     Tobacco Use    Smoking status: Never Smoker    Smokeless tobacco: Never Used   Substance Use Topics    Alcohol use: No     Review of Systems: All 12 systems reviewed and pertinent positives noted above. Objective:  Vascular: DP and PT pulses palpable 2/4, bilateral.  CFT <3 seconds, bilateral.  Hair growth present to the level of the digits, bilateral.  Edema present, bilateral.  Varicosities present, bilateral. Erythema absent, bilateral. Distal Rubor absent bilateral.  Temperature within normal limits bilateral. Hyperpigmentation present bilateral. Atrophic skin yes. Neurological: Sensation intact to light touch to level of digits, bilateral.  Protective sensation intact 10/10 sites via 5.07/10g [place] Monofilament, bilateral.  negative Tinel's, bilateral.  negative Valleix sign, bilateral.  Vibratory intact bilateral.  Reflexes Decreased bilateral.  Paresthesias positive. Dysthesias negative.   Sharp/dull intact bilateral.    Musculoskeletal: Muscle strength 5/5, bilateral.  Pain absent upon palpation bilateral. Normal medial longitudinal arch, bilateral.  Ankle ROM decresed,bilateral.  1st MPJ ROM within normal limits, bilateral.  Dorsally contracted digits absent. No other foot deformities. Integument: Open lesion absent, Bilateral.  Interdigital maceration absent to web spaces,absent Bilateral.  Nails left 1, 2, 3, 4, 5 and right 1, 2, 3, 4, 5 thickened, dystrophic and crumbly, discolored with subungual debris. Fissures absent, Bilateral. Hyperkeratotic tissue is absent. Seen dorsal R hallux ipj. Assessment:    Diagnosis Orders   1. DM (diabetes mellitus), type 2 with neurological complications ([facility] [address]     2. Dermatophytosis of nail     3. Corns and callosities         Plan:  Diabetic foot education and exam.  All nails as mentioned above debrided in length and thickness. Patient advised OTC methods for treatment of the mycotic nails. Patient will follow up in 10 weeks.

## 2022-07-14 ENCOUNTER — OFFICE VISIT (OUTPATIENT)
Dept: PODIATRY | Age: 77
End: 2022-07-14
Payer: MEDICARE

## 2022-07-14 VITALS
HEART RATE: 72 BPM | HEIGHT: 72 IN | DIASTOLIC BLOOD PRESSURE: 78 MMHG | BODY MASS INDEX: 36.86 KG/M2 | SYSTOLIC BLOOD PRESSURE: 136 MMHG

## 2022-07-14 DIAGNOSIS — B35.1 DERMATOPHYTOSIS OF NAIL: ICD-10-CM

## 2022-07-14 DIAGNOSIS — E11.49 DM (DIABETES MELLITUS), TYPE 2 WITH NEUROLOGICAL COMPLICATIONS (HCC): Primary | ICD-10-CM

## 2022-07-14 PROCEDURE — 99999 PR OFFICE/OUTPT VISIT,PROCEDURE ONLY: CPT | Performed by: PODIATRIST

## 2022-07-14 PROCEDURE — 11721 DEBRIDE NAIL 6 OR MORE: CPT | Performed by: PODIATRIST

## 2022-07-14 NOTE — PROGRESS NOTES
Foot Care Worksheet  PCP: Betzaida Pulido MD  Last visit: 7/12/2022    Nail description:  Thick , Yellow , Crumbly , Marked limitation of ambulation     Pain resulting from thickened and dystrophy of nail plate No    Nails involved  Right   1, 2, 3, 4, 5  (T5-T9)  Left     1, 2, 3, 4, 5  (TA-T4)    Q7 1 Class A Finding - Non traumatic amputation of foot No    Q8 2 Class B Findings - Absent DP pulse No, Absent PT pulse No, Advanced tropic changes (3 required) Yes    Decrease hair growth Yes, Nail changes/thickening Yes, Pigmented changes/discoloration Yes, Skin texture (thin, shiny) Yes, Skin color (rubor/redness) No    Q9 1 Class B and 2 Class C Findings  Claudication No, Temperature change Yes, Paresthesia Yes a, Burning No, Edema Yes

## 2022-07-14 NOTE — PROGRESS NOTES
Subjective:  Patient presents to [facility] today for routine diabetic foot care. Patient's diabetic control has been not changed. No n/v/f/c/d. Allergies   Allergen Reactions    Bupropion      Other reaction(s): Other See Comments  unknown    Heparin      Other reaction(s): AOF  Other reaction(s): AOF    Menadione      Is able to take the pill form of vitamin K    Other     Phytonadione     Vitamin K     Vitamin K And Related Hives     Is able to take the pill form of vitamin K    Vitamin K1     Diphenhydramine Rash, Other (See Comments) and Hives     Pt had difficulty breathing when he was given benadryl and heparin IV, he has no problems with oral benadryl. When combined with heparin       Past Medical History:   Diagnosis Date    Cancer ([facility] [address]     skin    Chronic kidney disease     Diabetes mellitus ([facility] [address]     [facility] of [facility]    Hyperlipidemia     Hypertension     Neuropathy     Stroke ([facility] [address] 02/02/2020    Thyroid disease     Type 2 diabetes mellitus without complication (HCC)     UTI (urinary tract infection)        Prior to Admission medications    Medication Sig Start Date End Date Taking?  Authorizing Provider   Multiple Vitamin (MULTI-VITAMIN PO) Take by mouth daily   Yes Historical Provider, MD   FERROUS SULFATE PO Take 5 g by mouth 2 times daily   Yes Historical Provider, MD   insulin glargine (LANTUS;BASAGLAR) 100 UNIT/ML injection pen Inject 70 Units into the skin daily   Yes Historical Provider, MD   mirabegron (MYRBETRIQ) 50 MG TB24 Take 50 mg by mouth daily 2/17/21  Yes Historical Provider, MD   insulin lispro (HUMALOG) 100 UNIT/ML SOLN injection vial Inject into the skin 3 times daily (before meals)   Yes Historical Provider, MD   Evolocumab (Alyssia Duane) 703 MG/ML SOAJ INJECT 1 PEN SUBCUTANEOUSLY EVERY TWO WEEKS 9/16/21  Yes Historical Provider, MD   Diabetic Shoe MISC by Does not apply route Dispense dm shoe and insoles  DM (diabetes mellitus), type 2 with neurological complications ([facility] [address]  (primary encounter diagnosis)  Dermatophytosis of nail  Equinus deformity of both feet  Hammer toe, unspecified laterality 12/2/20  Yes Joselin Prior, DPM   cyanocobalamin 100 MCG tablet Take 100 mcg by mouth daily   Yes Historical Provider, MD   aspirin 325 MG tablet Take 325 mg by mouth daily   Yes Historical Provider, MD   cephALEXin (KEFLEX) 500 MG capsule  12/20/18  Yes Historical Provider, MD   tacrolimus (PROGRAF) 1 MG capsule  12/4/18  Yes Historical Provider, MD   doxycycline hyclate (VIBRAMYCIN) 100 MG capsule Take 100 mg by mouth 2 times daily   Yes Historical Provider, MD   tamsulosin (FLOMAX) 0.4 MG capsule Take 0.4 mg by mouth daily   Yes Historical Provider, MD   gabapentin (NEURONTIN) 100 MG capsule Take 100 mg by mouth 3 times daily   Yes Historical Provider, MD   Compression Stockings MISC by Does not apply route   Yes Historical Provider, MD   calcitRIOL (ROCALTROL) 0.25 MCG capsule Take 0.25 mcg by mouth daily. Yes Historical Provider, MD   Vitamin D (CHOLECALCIFEROL) 1000 UNITS CAPS capsule Take 1,000 Units by mouth daily. Yes Historical Provider, MD   sirolimus (RAPAMUNE) 1 MG tablet Take 2 mg by mouth daily. Yes Historical Provider, MD   MYCOPHENOLATE MOFETIL PO Take  by mouth. Indications: 1000mg - BID   Yes Historical Provider, MD   levothyroxine (SYNTHROID) 50 MCG tablet Take 50 mcg by mouth Daily. Yes Historical Provider, MD   diphenoxylate-atropine (LOMOTIL) 2.5-0.025 MG per tablet Take 1 tablet by mouth 4 times daily as needed for Diarrhea. Yes Historical Provider, MD   amLODIPine (NORVASC) 5 MG tablet Take 5 mg by mouth daily. Yes Historical Provider, MD   furosemide (LASIX) 20 MG tablet Take 20 mg by mouth daily. Yes Historical Provider, MD   CALCIUM POLYCARBOPHIL PO Take 500 mg by mouth daily.    Yes Historical Provider, MD   atorvastatin (LIPITOR) 40 MG tablet Take 40 mg by mouth daily. Yes Historical Provider, MD   HYDROcodone-acetaminophen (NORCO) 5-325 MG per tablet Take 1 tablet by mouth every 6 hours as needed for Pain (1 or 2 tabs). Yes Historical Provider, MD   Omega-3 Fatty Acids (FISH OIL PEARLS PO) Take  by mouth 2 times daily. Yes Historical Provider, MD   Pantoprazole Sodium (PROTONIX) 40 MG PACK packet Take 40 mg by mouth daily. Yes Historical Provider, MD   insulin regular (HUMULIN R;NOVOLIN R) 100 UNIT/ML injection Inject into the skin See Admin Instructions Indications: sliding scale    Yes Historical Provider, MD   insulin NPH (HUMULIN N;NOVOLIN N) 100 UNIT/ML injection vial Inject into the skin 2 times daily (before meals) Indications: 60units in the am and 50units in the pm    Yes Historical Provider, MD       Social History     Tobacco Use    Smoking status: Never Smoker    Smokeless tobacco: Never Used   Substance Use Topics    Alcohol use: No     Review of Systems: All 12 systems reviewed and pertinent positives noted above. Objective:  Vascular: DP and PT pulses palpable 2/4, bilateral.  CFT <3 seconds, bilateral.  Hair growth present to the level of the digits, bilateral.  Edema present, bilateral.  Varicosities present, bilateral. Erythema absent, bilateral. Distal Rubor absent bilateral.  Temperature within normal limits bilateral. Hyperpigmentation present bilateral. Atrophic skin yes. Neurological: Sensation intact to light touch to level of digits, bilateral.  Protective sensation intact 10/10 sites via 5.07/10g [place]-Loy Monofilament, bilateral.  negative Tinel's, bilateral.  negative Valleix sign, bilateral.  Vibratory intact bilateral.  Reflexes Decreased bilateral.  Paresthesias positive. Dysthesias negative.   Sharp/dull intact bilateral.    Musculoskeletal: Muscle strength 5/5, bilateral.  Pain absent upon palpation bilateral. Normal medial longitudinal arch, bilateral.  Ankle ROM decresed,bilateral.  1st MPJ ROM within normal limits, bilateral.  Dorsally contracted digits absent. No other foot deformities. Integument: Open lesion absent, Bilateral.  Interdigital maceration absent to web spaces,absent Bilateral.  Nails left 1, 2, 3, 4, 5 and right 1, 2, 3, 4, 5 thickened, dystrophic and crumbly, discolored with subungual debris. Fissures absent, Bilateral. Hyperkeratotic tissue is absent. Seen dorsal R hallux ipj. Assessment:    Diagnosis Orders   1. DM (diabetes mellitus), type 2 with neurological complications ([facility] [address]     2. Dermatophytosis of nail         Plan:  Diabetic foot education and exam.  All nails as mentioned above debrided in length and thickness. Patient advised OTC methods for treatment of the mycotic nails. Patient will follow up in 10 weeks.

## 2022-09-22 ENCOUNTER — OFFICE VISIT (OUTPATIENT)
Dept: PODIATRY | Age: 77
End: 2022-09-22
Payer: MEDICARE

## 2022-09-22 VITALS — DIASTOLIC BLOOD PRESSURE: 84 MMHG | SYSTOLIC BLOOD PRESSURE: 134 MMHG | HEART RATE: 72 BPM

## 2022-09-22 DIAGNOSIS — B35.1 DERMATOPHYTOSIS OF NAIL: ICD-10-CM

## 2022-09-22 DIAGNOSIS — E11.49 DM (DIABETES MELLITUS), TYPE 2 WITH NEUROLOGICAL COMPLICATIONS (HCC): Primary | ICD-10-CM

## 2022-09-22 DIAGNOSIS — L84 CORNS AND CALLOSITIES: ICD-10-CM

## 2022-09-22 PROCEDURE — 99999 PR OFFICE/OUTPT VISIT,PROCEDURE ONLY: CPT | Performed by: PODIATRIST

## 2022-09-22 PROCEDURE — 11721 DEBRIDE NAIL 6 OR MORE: CPT | Performed by: PODIATRIST

## 2022-09-22 NOTE — PROGRESS NOTES
Subjective:  Patient presents to [facility] today for routine diabetic foot care. Patient's diabetic control has been not changed. No n/v/f/c/d. Allergies   Allergen Reactions    Bupropion      Other reaction(s): Other See Comments  unknown    Heparin      Other reaction(s): AOF  Other reaction(s): AOF    Menadione      Is able to take the pill form of vitamin K    Other     Phytonadione     Vitamin K     Vitamin K And Related Hives     Is able to take the pill form of vitamin K    Vitamin K1     Diphenhydramine Rash, Other (See Comments) and Hives     Pt had difficulty breathing when he was given benadryl and heparin IV, he has no problems with oral benadryl. 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  Authorizing Provider   Multiple Vitamin (MULTI-VITAMIN PO) Take by mouth daily   Yes Historical Provider, MD   FERROUS SULFATE PO Take 5 g by mouth 2 times daily   Yes Historical Provider, MD   insulin glargine (LANTUS;BASAGLAR) 100 UNIT/ML injection pen Inject 70 Units into the skin daily   Yes Historical Provider, MD   mirabegron (MYRBETRIQ) 50 MG TB24 Take 50 mg by mouth daily 2/17/21  Yes Historical Provider, MD   insulin lispro (HUMALOG) 100 UNIT/ML SOLN injection vial Inject into the skin 3 times daily (before meals)   Yes Historical Provider, MD   Evolocumab (Loralie Stargarland) 089 MG/ML SOAJ INJECT 1 PEN SUBCUTANEOUSLY EVERY TWO WEEKS 9/16/21  Yes Historical Provider, MD   Diabetic Shoe MISC by Does not apply route Dispense dm shoe and insoles  DM (diabetes mellitus), type 2 with neurological complications ([facility] [address]  (primary encounter diagnosis)  Dermatophytosis of nail  Equinus deformity of both feet  Hammer toe, unspecified laterality 12/2/20  Yes Carlin Andino DPM   cyanocobalamin 100 MCG tablet Take 100 mcg by mouth daily   Yes Historical Provider, MD   aspirin 325 MG tablet Take 325 mg by mouth daily   Yes Historical Provider, MD   cephALEXin (KEFLEX) 500 MG capsule  12/20/18  Yes Historical Provider, MD   tacrolimus (PROGRAF) 1 MG capsule  12/4/18  Yes Historical Provider, MD   doxycycline hyclate (VIBRAMYCIN) 100 MG capsule Take 100 mg by mouth 2 times daily   Yes Historical Provider, MD   tamsulosin (FLOMAX) 0.4 MG capsule Take 0.4 mg by mouth daily   Yes Historical Provider, MD   gabapentin (NEURONTIN) 100 MG capsule Take 100 mg by mouth 3 times daily   Yes Historical Provider, MD   Compression Stockings MISC by Does not apply route   Yes Historical Provider, MD   calcitRIOL (ROCALTROL) 0.25 MCG capsule Take 0.25 mcg by mouth daily. Yes Historical Provider, MD   Vitamin D (CHOLECALCIFEROL) 1000 UNITS CAPS capsule Take 1,000 Units by mouth daily. Yes Historical Provider, MD   sirolimus (RAPAMUNE) 1 MG tablet Take 2 mg by mouth daily. Yes Historical Provider, MD   MYCOPHENOLATE MOFETIL PO Take  by mouth. Indications: 1000mg - BID   Yes Historical Provider, MD   levothyroxine (SYNTHROID) 50 MCG tablet Take 50 mcg by mouth Daily. Yes Historical Provider, MD   diphenoxylate-atropine (LOMOTIL) 2.5-0.025 MG per tablet Take 1 tablet by mouth 4 times daily as needed for Diarrhea. Yes Historical Provider, MD   amLODIPine (NORVASC) 5 MG tablet Take 5 mg by mouth daily. Yes Historical Provider, MD   furosemide (LASIX) 20 MG tablet Take 20 mg by mouth daily. Yes Historical Provider, MD   CALCIUM POLYCARBOPHIL PO Take 500 mg by mouth daily. Yes Historical Provider, MD   atorvastatin (LIPITOR) 40 MG tablet Take 40 mg by mouth daily.    Yes Historical Provider, MD   HYDROcodone-acetaminophen (NORCO) 5-325 MG per tablet Take 1 tablet by mouth every 6 hours as needed for Pain (1 or 2 tabs). Yes Historical Provider, MD   Omega-3 Fatty Acids (FISH OIL PEARLS PO) Take  by mouth 2 times daily. Yes Historical Provider, MD   Pantoprazole Sodium (PROTONIX) 40 MG PACK packet Take 40 mg by mouth daily. Yes Historical Provider, MD   insulin regular (HUMULIN R;NOVOLIN R) 100 UNIT/ML injection Inject into the skin See Admin Instructions Indications: sliding scale    Yes Historical Provider, MD   insulin NPH (HUMULIN N;NOVOLIN N) 100 UNIT/ML injection vial Inject into the skin 2 times daily (before meals) Indications: 60units in the am and 50units in the pm    Yes Historical Provider, MD       Social History     Tobacco Use    Smoking status: Never    Smokeless tobacco: Never   Substance Use Topics    Alcohol use: No     Review of Systems: All 12 systems reviewed and pertinent positives noted above. Objective:  Vascular: DP and PT pulses palpable 2/4, bilateral.  CFT <3 seconds, bilateral.  Hair growth present to the level of the digits, bilateral.  Edema present, bilateral.  Varicosities present, bilateral. Erythema absent, bilateral. Distal Rubor absent bilateral.  Temperature within normal limits bilateral. Hyperpigmentation present bilateral. Atrophic skin yes. Neurological: Sensation intact to light touch to level of digits, bilateral.  Protective sensation intact 10/10 sites via 5.07/10g [place]-Loy Monofilament, bilateral.  negative Tinel's, bilateral.  negative Valleix sign, bilateral.  Vibratory intact bilateral.  Reflexes Decreased bilateral.  Paresthesias positive. Dysthesias negative.   Sharp/dull intact bilateral.    Musculoskeletal: Muscle strength 5/5, bilateral.  Pain absent upon palpation bilateral. Normal medial longitudinal arch, bilateral.  Ankle ROM decresed,bilateral.  1st MPJ ROM within normal limits, bilateral.  Dorsally contracted digits absent. No other foot deformities. Integument: Open lesion absent, Bilateral.  Interdigital maceration absent to web spaces,absent Bilateral.  Nails left 1, 2, 3, 4, 5 and right 1, 2, 3, 4, 5 thickened, dystrophic and crumbly, discolored with subungual debris. Fissures absent, Bilateral. Hyperkeratotic tissue is absent. Seen dorsal R hallux ipj. Assessment:    Diagnosis Orders   1. DM (diabetes mellitus), type 2 with neurological complications ([facility] [address]        2. Dermatophytosis of nail        3. Corns and callosities            Plan:  Diabetic foot education and exam.  All nails as mentioned above debrided in length and thickness. Patient advised OTC methods for treatment of the mycotic nails. Patient will follow up in 10 weeks.

## 2022-12-01 ENCOUNTER — OFFICE VISIT (OUTPATIENT)
Dept: PODIATRY | Age: 77
End: 2022-12-01
Payer: MEDICARE

## 2022-12-01 VITALS
SYSTOLIC BLOOD PRESSURE: 138 MMHG | WEIGHT: 250 LBS | HEART RATE: 65 BPM | DIASTOLIC BLOOD PRESSURE: 76 MMHG | HEIGHT: 72 IN | BODY MASS INDEX: 33.86 KG/M2

## 2022-12-01 DIAGNOSIS — B35.1 DERMATOPHYTOSIS OF NAIL: ICD-10-CM

## 2022-12-01 DIAGNOSIS — E11.49 DM (DIABETES MELLITUS), TYPE 2 WITH NEUROLOGICAL COMPLICATIONS (HCC): Primary | ICD-10-CM

## 2022-12-01 DIAGNOSIS — L84 CORNS AND CALLOSITIES: ICD-10-CM

## 2022-12-01 PROCEDURE — 11721 DEBRIDE NAIL 6 OR MORE: CPT | Performed by: PODIATRIST

## 2022-12-01 NOTE — PROGRESS NOTES
Foot Care Worksheet  PCP: Earl Arita MD  Last visit: 9/1/2022    Nail description: Thick , Yellow , Crumbly , Marked limitation of ambulation     Pain resulting from thickened and dystrophy of nail plate No    Nails involved  Right   1, 2, 3, 4, 5  (T5-T9)  Left     1, 2, 3, 4, 5  (TA-T4)    Q7 1 Class A Finding - Non traumatic amputation of foot No    Q8 2 Class B Findings - Absent DP pulse No, Absent PT pulse No, Advanced tropic changes (3 required) Yes    Decrease hair growth Yes, Nail changes/thickening Yes, Pigmented changes/discoloration Yes, Skin texture (thin, shiny) Yes, Skin color (rubor/redness) No    Q9 1 Class B and 2 Class C Findings  Claudication No, Temperature change Yes, Paresthesia Yes a, Burning No, Edema Yes  Subjective:  Patient presents to [facility] today for routine diabetic foot care. Patient's diabetic control has been not changed. No n/v/f/c/d. Allergies   Allergen Reactions    Bupropion      Other reaction(s): Other See Comments  unknown    Heparin      Other reaction(s): AOF  Other reaction(s): AOF    Menadione      Is able to take the pill form of vitamin K    Other     Phytonadione     Vitamin K     Vitamin K And Related Hives     Is able to take the pill form of vitamin K    Vitamin K1     Diphenhydramine Rash, Other (See Comments) and Hives     Pt had difficulty breathing when he was given benadryl and heparin IV, he has no problems with oral benadryl. When combined with heparin       Past Medical History:   Diagnosis Date    Cancer ([address]     skin    Chronic kidney disease     Diabetes mellitus ([address]     History of total left knee replacement 01/02/2020    [facility]  [place]    Hyperlipidemia     Hypertension     Neuropathy     Stroke [facility]) 02/02/2020    Thyroid disease     Type 2 diabetes mellitus without complication ([address]     UTI (urinary tract infection)        Prior to Admission medications    Medication Sig Start Date End Date Taking?  Authorizing Provider Cholecalciferol 50 MCG (2000 UT) CAPS Take 2,000 Units by mouth daily   Yes Historical Provider, MD   Multiple Vitamin (MULTI-VITAMIN PO) Take by mouth daily   Yes Historical Provider, MD   FERROUS SULFATE PO Take 5 g by mouth 2 times daily   Yes Historical Provider, MD   insulin glargine (LANTUS;BASAGLAR) 100 UNIT/ML injection pen Inject 70 Units into the skin daily   Yes Historical Provider, MD   mirabegron (MYRBETRIQ) 50 MG TB24 Take 50 mg by mouth daily 2/17/21  Yes Historical Provider, MD   insulin lispro (HUMALOG) 100 UNIT/ML SOLN injection vial Inject into the skin 3 times daily (before meals)   Yes Historical Provider, MD   Evolocumab (Alma Farmer) 250 MG/ML SOAJ INJECT 1 PEN SUBCUTANEOUSLY EVERY TWO WEEKS 9/16/21  Yes Historical Provider, MD   Diabetic Shoe MISC by Does not apply route Dispense dm shoe and insoles  DM (diabetes mellitus), type 2 with neurological complications ([facility] [address]  (primary encounter diagnosis)  Dermatophytosis of nail  Equinus deformity of both feet  Hammer toe, unspecified laterality 12/2/20  Yes Cate Peguero DPM   cyanocobalamin 100 MCG tablet Take 100 mcg by mouth daily   Yes Historical Provider, MD   aspirin 325 MG tablet Take 325 mg by mouth daily   Yes Historical Provider, MD   cephALEXin (KEFLEX) 500 MG capsule  12/20/18  Yes Historical Provider, MD   tacrolimus (PROGRAF) 1 MG capsule  12/4/18  Yes Historical Provider, MD   doxycycline hyclate (VIBRAMYCIN) 100 MG capsule Take 100 mg by mouth 2 times daily   Yes Historical Provider, MD   tamsulosin (FLOMAX) 0.4 MG capsule Take 0.4 mg by mouth daily   Yes Historical Provider, MD   gabapentin (NEURONTIN) 100 MG capsule Take 100 mg by mouth 3 times daily   Yes Historical Provider, MD   Compression Stockings MISC by Does not apply route   Yes Historical Provider, MD   calcitRIOL (ROCALTROL) 0.25 MCG capsule Take 0.25 mcg by mouth daily.    Yes Historical Provider, MD   Vitamin D (CHOLECALCIFEROL) 1000 UNITS CAPS capsule Take 1,000 Units by mouth daily. Yes Historical Provider, MD   sirolimus (RAPAMUNE) 1 MG tablet Take 2 mg by mouth daily. Yes Historical Provider, MD   MYCOPHENOLATE MOFETIL PO Take  by mouth. Indications: 1000mg - BID   Yes Historical Provider, MD   levothyroxine (SYNTHROID) 50 MCG tablet Take 50 mcg by mouth Daily. Yes Historical Provider, MD   diphenoxylate-atropine (LOMOTIL) 2.5-0.025 MG per tablet Take 1 tablet by mouth 4 times daily as needed for Diarrhea. Yes Historical Provider, MD   amLODIPine (NORVASC) 5 MG tablet Take 5 mg by mouth daily. Yes Historical Provider, MD   furosemide (LASIX) 20 MG tablet Take 20 mg by mouth daily. Yes Historical Provider, MD   CALCIUM POLYCARBOPHIL PO Take 500 mg by mouth daily. Yes Historical Provider, MD   atorvastatin (LIPITOR) 40 MG tablet Take 40 mg by mouth daily. Yes Historical Provider, MD   HYDROcodone-acetaminophen (NORCO) 5-325 MG per tablet Take 1 tablet by mouth every 6 hours as needed for Pain (1 or 2 tabs). Yes Historical Provider, MD   Omega-3 Fatty Acids (FISH OIL PEARLS PO) Take  by mouth 2 times daily. Yes Historical Provider, MD   Pantoprazole Sodium (PROTONIX) 40 MG PACK packet Take 40 mg by mouth daily. Yes Historical Provider, MD   insulin regular (HUMULIN R;NOVOLIN R) 100 UNIT/ML injection Inject into the skin See Admin Instructions Indications: sliding scale    Yes Historical Provider, MD   insulin NPH (HUMULIN N;NOVOLIN N) 100 UNIT/ML injection vial Inject into the skin 2 times daily (before meals) Indications: 60units in the am and 50units in the pm    Yes Historical Provider, MD       Social History     Tobacco Use    Smoking status: Never    Smokeless tobacco: Never   Substance Use Topics    Alcohol use: No     Review of Systems: All 12 systems reviewed and pertinent positives noted above.   Objective:  Vascular: DP and PT pulses palpable 2/4, bilateral.  CFT <3 seconds, bilateral.  Hair growth present to the level of the digits, bilateral.  Edema present, bilateral.  Varicosities present, bilateral. Erythema absent, bilateral. Distal Rubor absent bilateral.  Temperature within normal limits bilateral. Hyperpigmentation present bilateral. Atrophic skin yes. Neurological: Sensation intact to light touch to level of digits, bilateral.  Protective sensation intact 10/10 sites via 5.07/10g [place]Loy Monofilament, bilateral.  negative Tinel's, bilateral.  negative Valleix sign, bilateral.  Vibratory intact bilateral.  Reflexes Decreased bilateral.  Paresthesias positive. Dysthesias negative. Sharp/dull intact bilateral.    Musculoskeletal: Muscle strength 5/5, bilateral.  Pain absent upon palpation bilateral. Normal medial longitudinal arch, bilateral.  Ankle ROM decresed,bilateral.  1st MPJ ROM within normal limits, bilateral.  Dorsally contracted digits absent. No other foot deformities. Integument: Open lesion absent, Bilateral.  Interdigital maceration absent to web spaces,absent Bilateral.  Nails left 1, 2, 3, 4, 5 and right 1, 2, 3, 4, 5 thickened, dystrophic and crumbly, discolored with subungual debris. Fissures absent, Bilateral. Hyperkeratotic tissue is absent. Seen dorsal R hallux ipj. Assessment:    Diagnosis Orders   1. DM (diabetes mellitus), type 2 with neurological complications ([address]        2. Dermatophytosis of nail        3. Corns and callosities            Plan:  Diabetic foot education and exam.  All nails as mentioned above debrided in length and thickness. Patient advised OTC methods for treatment of the mycotic nails. Patient will follow up in 10 weeks.

## 2023-02-10 ENCOUNTER — OFFICE VISIT (OUTPATIENT)
Dept: PODIATRY | Age: 78
End: 2023-02-10
Payer: MEDICARE

## 2023-02-10 VITALS
DIASTOLIC BLOOD PRESSURE: 72 MMHG | WEIGHT: 250 LBS | HEIGHT: 72 IN | HEART RATE: 76 BPM | BODY MASS INDEX: 33.86 KG/M2 | SYSTOLIC BLOOD PRESSURE: 134 MMHG

## 2023-02-10 DIAGNOSIS — B35.1 DERMATOPHYTOSIS OF NAIL: ICD-10-CM

## 2023-02-10 DIAGNOSIS — E11.49 DM (DIABETES MELLITUS), TYPE 2 WITH NEUROLOGICAL COMPLICATIONS (HCC): Primary | ICD-10-CM

## 2023-02-10 PROCEDURE — 11721 DEBRIDE NAIL 6 OR MORE: CPT | Performed by: PODIATRIST

## 2023-02-10 NOTE — PROGRESS NOTES
Foot Care Worksheet  PCP: Ammon Zamora MD  Last visit: 3/7/22    Nail description: Thick , Yellow , Crumbly , Marked limitation of ambulation     Pain resulting from thickened and dystrophy of nail plate No    Nails involved  Right   1, 2, 3, 4, 5  (T5-T9)  Left     1, 2, 3, 4, 5  (TA-T4)    Q7 1 Class A Finding - Non traumatic amputation of foot No    Q8 2 Class B Findings - Absent DP pulse No, Absent PT pulse No, Advanced tropic changes (3 required) Yes    Decrease hair growth Yes, Nail changes/thickening Yes, Pigmented changes/discoloration Yes, Skin texture (thin, shiny) Yes, Skin color (rubor/redness) No    Q9 1 Class B and 2 Class C Findings  Claudication No, Temperature change Yes, Paresthesia Yes a, Burning No, Edema Yes  Subjective:  Patient presents to [facility] today for routine diabetic foot care. Patient's diabetic control has been not changed. No n/v/f/c/d. Allergies   Allergen Reactions    Bupropion      Other reaction(s): Other See Comments  unknown    Heparin      Other reaction(s): AOF  Other reaction(s): AOF    Menadione      Is able to take the pill form of vitamin K    Other     Phytonadione     Vitamin K     Vitamin K And Related Hives     Is able to take the pill form of vitamin K    Vitamin K1     Diphenhydramine Rash, Other (See Comments) and Hives     Pt had difficulty breathing when he was given benadryl and heparin IV, he has no problems with oral benadryl. When combined with heparin       Past Medical History:   Diagnosis Date    Cancer ([address]     skin    Chronic kidney disease     Diabetes mellitus ([address]     History of total left knee replacement 01/02/2020    [facility] [place]    Hyperlipidemia     Hypertension     Neuropathy     Stroke [facility]) 02/02/2020    Thyroid disease     Type 2 diabetes mellitus without complication ([address]     UTI (urinary tract infection)        Prior to Admission medications    Medication Sig Start Date End Date Taking?  Authorizing Provider Cholecalciferol 50 MCG (2000 UT) CAPS Take 2,000 Units by mouth daily   Yes Historical Provider, MD   Multiple Vitamin (MULTI-VITAMIN PO) Take by mouth daily   Yes Historical Provider, MD   FERROUS SULFATE PO Take 5 g by mouth 2 times daily   Yes Historical Provider, MD   insulin glargine (LANTUS;BASAGLAR) 100 UNIT/ML injection pen Inject 70 Units into the skin daily   Yes Historical Provider, MD   mirabegron (MYRBETRIQ) 50 MG TB24 Take 50 mg by mouth daily 2/17/21  Yes Historical Provider, MD   insulin lispro (HUMALOG) 100 UNIT/ML SOLN injection vial Inject into the skin 3 times daily (before meals)   Yes Historical Provider, MD   Evolocumab (Sol Buggy) 554 MG/ML SOAJ INJECT 1 PEN SUBCUTANEOUSLY EVERY TWO WEEKS 9/16/21  Yes Historical Provider, MD   Diabetic Shoe MISC by Does not apply route Dispense dm shoe and insoles  DM (diabetes mellitus), type 2 with neurological complications ([facility] [address]  (primary encounter diagnosis)  Dermatophytosis of nail  Equinus deformity of both feet  Hammer toe, unspecified laterality 12/2/20  Yes Holland Clear, DPM   cyanocobalamin 100 MCG tablet Take 100 mcg by mouth daily   Yes Historical Provider, MD   aspirin 325 MG tablet Take 325 mg by mouth daily   Yes Historical Provider, MD   cephALEXin (KEFLEX) 500 MG capsule  12/20/18  Yes Historical Provider, MD   tacrolimus (PROGRAF) 1 MG capsule  12/4/18  Yes Historical Provider, MD   doxycycline hyclate (VIBRAMYCIN) 100 MG capsule Take 100 mg by mouth 2 times daily   Yes Historical Provider, MD   tamsulosin (FLOMAX) 0.4 MG capsule Take 0.4 mg by mouth daily   Yes Historical Provider, MD   gabapentin (NEURONTIN) 100 MG capsule Take 100 mg by mouth 3 times daily   Yes Historical Provider, MD   Compression Stockings MISC by Does not apply route   Yes Historical Provider, MD   calcitRIOL (ROCALTROL) 0.25 MCG capsule Take 0.25 mcg by mouth daily.    Yes Historical Provider, MD   Vitamin D (CHOLECALCIFEROL) 1000 UNITS CAPS capsule Take 1,000 Units by mouth daily. Yes Historical Provider, MD   sirolimus (RAPAMUNE) 1 MG tablet Take 2 mg by mouth daily. Yes Historical Provider, MD   MYCOPHENOLATE MOFETIL PO Take  by mouth. Indications: 1000mg - BID   Yes Historical Provider, MD   levothyroxine (SYNTHROID) 50 MCG tablet Take 50 mcg by mouth Daily. Yes Historical Provider, MD   diphenoxylate-atropine (LOMOTIL) 2.5-0.025 MG per tablet Take 1 tablet by mouth 4 times daily as needed for Diarrhea. Yes Historical Provider, MD   amLODIPine (NORVASC) 5 MG tablet Take 5 mg by mouth daily. Yes Historical Provider, MD   furosemide (LASIX) 20 MG tablet Take 20 mg by mouth daily. Yes Historical Provider, MD   CALCIUM POLYCARBOPHIL PO Take 500 mg by mouth daily. Yes Historical Provider, MD   atorvastatin (LIPITOR) 40 MG tablet Take 40 mg by mouth daily. Yes Historical Provider, MD   HYDROcodone-acetaminophen (NORCO) 5-325 MG per tablet Take 1 tablet by mouth every 6 hours as needed for Pain (1 or 2 tabs). Yes Historical Provider, MD   Omega-3 Fatty Acids (FISH OIL PEARLS PO) Take  by mouth 2 times daily. Yes Historical Provider, MD   Pantoprazole Sodium (PROTONIX) 40 MG PACK packet Take 40 mg by mouth daily. Yes Historical Provider, MD   insulin regular (HUMULIN R;NOVOLIN R) 100 UNIT/ML injection Inject into the skin See Admin Instructions Indications: sliding scale    Yes Historical Provider, MD   insulin NPH (HUMULIN N;NOVOLIN N) 100 UNIT/ML injection vial Inject into the skin 2 times daily (before meals) Indications: 60units in the am and 50units in the pm    Yes Historical Provider, MD       Social History     Tobacco Use    Smoking status: Never    Smokeless tobacco: Never   Substance Use Topics    Alcohol use: No     Review of Systems: All 12 systems reviewed and pertinent positives noted above.   Objective:  Vascular: DP and PT pulses palpable 2/4, bilateral.  CFT <3 seconds, bilateral.  Hair growth present to the level of the digits, bilateral.  Edema present, bilateral.  Varicosities present, bilateral. Erythema absent, bilateral. Distal Rubor absent bilateral.  Temperature within normal limits bilateral. Hyperpigmentation present bilateral. Atrophic skin yes. Neurological: Sensation intact to light touch to level of digits, bilateral.  Protective sensation intact 10/10 sites via 5.07/10g [place]-Loy Monofilament, bilateral.  negative Tinel's, bilateral.  negative Valleix sign, bilateral.  Vibratory intact bilateral.  Reflexes Decreased bilateral.  Paresthesias positive. Dysthesias negative. Sharp/dull intact bilateral.    Musculoskeletal: Muscle strength 5/5, bilateral.  Pain absent upon palpation bilateral. Normal medial longitudinal arch, bilateral.  Ankle ROM decresed,bilateral.  1st MPJ ROM within normal limits, bilateral.  Dorsally contracted digits absent. No other foot deformities. Integument: Open lesion absent, Bilateral.  Interdigital maceration absent to web spaces,absent Bilateral.  Nails left 1, 2, 3, 4, 5 and right 1, 2, 3, 4, 5 thickened, dystrophic and crumbly, discolored with subungual debris. Fissures absent, Bilateral. Hyperkeratotic tissue is absent. Seen dorsal R hallux ipj. Assessment:    Diagnosis Orders   1. DM (diabetes mellitus), type 2 with neurological complications ([address]        2. Dermatophytosis of nail            Plan:  Diabetic foot education and exam.  All nails as mentioned above debrided in length and thickness. Patient advised OTC methods for treatment of the mycotic nails. Patient will follow up in 10 weeks.

## 2023-04-21 ENCOUNTER — OFFICE VISIT (OUTPATIENT)
Dept: PODIATRY | Age: 78
End: 2023-04-21
Payer: MEDICARE

## 2023-04-21 VITALS
BODY MASS INDEX: 28.75 KG/M2 | SYSTOLIC BLOOD PRESSURE: 118 MMHG | DIASTOLIC BLOOD PRESSURE: 62 MMHG | HEART RATE: 80 BPM | RESPIRATION RATE: 20 BRPM | WEIGHT: 212 LBS

## 2023-04-21 DIAGNOSIS — E11.49 DM (DIABETES MELLITUS), TYPE 2 WITH NEUROLOGICAL COMPLICATIONS (HCC): Primary | ICD-10-CM

## 2023-04-21 DIAGNOSIS — B35.1 DERMATOPHYTOSIS OF NAIL: ICD-10-CM

## 2023-04-21 PROCEDURE — 11721 DEBRIDE NAIL 6 OR MORE: CPT | Performed by: PODIATRIST

## 2023-04-21 NOTE — PROGRESS NOTES
Authorizing Provider   Cholecalciferol 50 MCG (2000 UT) CAPS Take 2,000 Units by mouth daily   Yes Historical Provider, MD   Multiple Vitamin (MULTI-VITAMIN PO) Take by mouth daily   Yes Historical Provider, MD   FERROUS SULFATE PO Take 5 g by mouth 2 times daily   Yes Historical Provider, MD   insulin glargine (LANTUS;BASAGLAR) 100 UNIT/ML injection pen Inject 70 Units into the skin daily   Yes Historical Provider, MD   mirabegron (MYRBETRIQ) 50 MG TB24 Take 50 mg by mouth daily 2/17/21  Yes Historical Provider, MD   insulin lispro (HUMALOG) 100 UNIT/ML SOLN injection vial Inject into the skin 3 times daily (before meals)   Yes Historical Provider, MD   Evolocumab ([place] Began) 540 MG/ [address] TWO WEEKS 9/16/21  Yes Historical Provider, MD   Diabetic Shoe MISC by Does not apply route Dispense dm shoe and insoles  DM (diabetes mellitus), type 2 with neurological complications ([facility] [address]  (primary encounter diagnosis)  Dermatophytosis of nail  Equinus deformity of both feet  Hammer toe, unspecified laterality 12/2/20  Yes Earmon Kid, DPM   cyanocobalamin 100 MCG tablet Take 1 tablet by mouth daily   Yes Historical Provider, MD   aspirin 325 MG tablet Take 1 tablet by mouth daily   Yes Historical Provider, MD   cephALEXin (KEFLEX) 500 MG capsule  12/20/18  Yes Historical Provider, MD   tacrolimus (PROGRAF) 1 MG capsule  12/4/18  Yes Historical Provider, MD   doxycycline hyclate (VIBRAMYCIN) 100 MG capsule Take 1 capsule by mouth 2 times daily   Yes Historical Provider, MD   tamsulosin (FLOMAX) 0.4 MG capsule Take 1 capsule by mouth daily   Yes Historical Provider, MD   gabapentin (NEURONTIN) 100 MG capsule Take 1 capsule by mouth 3 times daily.    Yes Historical Provider, MD   Compression Stockings MISC by Does not apply route   Yes Historical Provider, MD   calcitRIOL (ROCALTROL) 0.25 MCG capsule Take 1 capsule by mouth daily   Yes Historical Provider, MD   Vitamin D

## 2023-07-14 ENCOUNTER — OFFICE VISIT (OUTPATIENT)
Dept: PODIATRY | Age: 78
End: 2023-07-14
Payer: MEDICARE

## 2023-07-14 VITALS
BODY MASS INDEX: 29.8 KG/M2 | HEART RATE: 88 BPM | WEIGHT: 220 LBS | HEIGHT: 72 IN | SYSTOLIC BLOOD PRESSURE: 100 MMHG | DIASTOLIC BLOOD PRESSURE: 50 MMHG

## 2023-07-14 DIAGNOSIS — E11.49 DM (DIABETES MELLITUS), TYPE 2 WITH NEUROLOGICAL COMPLICATIONS (HCC): Primary | ICD-10-CM

## 2023-07-14 DIAGNOSIS — B35.1 DERMATOPHYTOSIS OF NAIL: ICD-10-CM

## 2023-07-14 DIAGNOSIS — L84 CORNS AND CALLOSITIES: ICD-10-CM

## 2023-07-14 PROCEDURE — 11721 DEBRIDE NAIL 6 OR MORE: CPT | Performed by: PODIATRIST

## 2023-07-14 NOTE — PROGRESS NOTES
Foot Care Worksheet  PCP: Suni Ferguson MD  Last visit: 03 / 10  / 2023 per patient    Nail description: Thick , Yellow , Crumbly , Marked limitation of ambulation     Pain resulting from thickened and dystrophy of nail plate No    Nails involved  Right   1, 2, 3, 4, 5  (T5-T9)  Left     1, 2, 3, 4, 5  (TA-T4)    Q7 1 Class A Finding - Non traumatic amputation of foot No    Q8 2 Class B Findings - Absent DP pulse No, Absent PT pulse No, Advanced tropic changes (3 required) Yes    Decrease hair growth Yes, Nail changes/thickening Yes, Pigmented changes/discoloration Yes, Skin texture (thin, shiny) Yes, Skin color (rubor/redness) No    Q9 1 Class B and 2 Class C Findings  Claudication No, Temperature change Yes, Paresthesia Yes a, Burning No, Edema Yes  Subjective:  Patient presents to [facility] today for routine diabetic foot care. Patient's diabetic control has been not changed. No n/v/f/c/d. Allergies   Allergen Reactions    Bupropion      Other reaction(s): Other See Comments  unknown    Heparin      Other reaction(s): AOF  Other reaction(s): AOF    Menadione      Is able to take the pill form of vitamin K    Other     Phytonadione     Vitamin K     Vitamin K And Related Hives     Is able to take the pill form of vitamin K    Vitamin K1     Diphenhydramine Rash, Other (See Comments) and Hives     Pt had difficulty breathing when he was given benadryl and heparin IV, he has no problems with oral benadryl. When combined with heparin       Past Medical History:   Diagnosis Date    Cancer ([facility])     skin    Chronic kidney disease     Diabetes mellitus ([facility])     History of total left knee replacement 01/02/2020    [facility]    Hyperlipidemia     Hypertension     Neuropathy     Stroke [facility]) 02/02/2020    Thyroid disease     Type 2 diabetes mellitus without complication ([facility])     UTI (urinary tract infection)        Prior to Admission medications    Medication Sig Start Date End Date Taking?

## 2023-09-12 ENCOUNTER — HOSPITAL ENCOUNTER (OUTPATIENT)
Age: 78
Discharge: HOME | End: 2023-09-12
Payer: MEDICARE

## 2023-09-12 PROCEDURE — 80195 ASSAY OF SIROLIMUS: CPT

## 2023-09-12 PROCEDURE — 36415 COLL VENOUS BLD VENIPUNCTURE: CPT

## 2023-09-14 LAB — SIROLIMUS BLD-MCNC: 4.3 NG/ML

## 2023-09-15 ENCOUNTER — HOSPITAL ENCOUNTER (OUTPATIENT)
Age: 78
Discharge: HOME | End: 2023-09-15
Payer: MEDICARE

## 2023-09-15 LAB
BASOPHILS # BLD: <0.03 K/UL (ref 0–0.2)
BASOPHILS NFR BLD: 1 % (ref 0–2)
EOSINOPHIL # BLD: 0.07 K/UL (ref 0–0.44)
EOSINOPHILS RELATIVE PERCENT: 2 % (ref 1–4)
ERYTHROCYTE [DISTWIDTH] IN BLOOD BY AUTOMATED COUNT: 14.6 % (ref 11.8–14.4)
HCT VFR BLD AUTO: 32 % (ref 40.7–50.3)
HGB BLD-MCNC: 9.5 G/DL (ref 13–17)
IMM GRANULOCYTES # BLD AUTO: <0.03 K/UL (ref 0–0.3)
IMM GRANULOCYTES NFR BLD: 1 %
LYMPHOCYTES NFR BLD: 0.74 K/UL (ref 1.1–3.7)
LYMPHOCYTES RELATIVE PERCENT: 19 % (ref 24–43)
MCH RBC QN AUTO: 27.8 PG (ref 25.2–33.5)
MCHC RBC AUTO-ENTMCNC: 29.7 G/DL (ref 25.2–33.5)
MCV RBC AUTO: 93.6 FL (ref 82.6–102.9)
MONOCYTES NFR BLD: 0.32 K/UL (ref 0.1–1.2)
MONOCYTES NFR BLD: 8 % (ref 3–12)
NEUTROPHILS NFR BLD: 69 % (ref 36–65)
NEUTS SEG NFR BLD: 2.82 K/UL (ref 1.5–8.1)
NRBC BLD-RTO: 0 PER 100 WBC
PLATELET # BLD AUTO: 214 K/UL (ref 138–453)
PMV BLD AUTO: 9.3 FL (ref 8.1–13.5)
RBC # BLD AUTO: 3.42 M/UL (ref 4.21–5.77)
RBC # BLD: ABNORMAL 10*6/UL
WBC OTHER # BLD: 4 K/UL (ref 3.5–11.3)

## 2023-09-15 PROCEDURE — 85025 COMPLETE CBC W/AUTO DIFF WBC: CPT

## 2023-09-15 PROCEDURE — 36415 COLL VENOUS BLD VENIPUNCTURE: CPT

## 2023-09-26 ENCOUNTER — HOSPITAL ENCOUNTER (OUTPATIENT)
Age: 78
Discharge: HOME | End: 2023-09-26
Payer: MEDICARE

## 2023-09-26 LAB
BASOPHILS # BLD: <0.03 K/UL (ref 0–0.2)
BASOPHILS NFR BLD: 1 % (ref 0–2)
EOSINOPHIL # BLD: 0.06 K/UL (ref 0–0.44)
EOSINOPHILS RELATIVE PERCENT: 1 % (ref 1–4)
ERYTHROCYTE [DISTWIDTH] IN BLOOD BY AUTOMATED COUNT: 14.9 % (ref 11.8–14.4)
HCT VFR BLD AUTO: 30.4 % (ref 40.7–50.3)
HGB BLD-MCNC: 9.2 G/DL (ref 13–17)
IMM GRANULOCYTES # BLD AUTO: 0.03 K/UL (ref 0–0.3)
IMM GRANULOCYTES NFR BLD: 1 %
IRON SATN MFR SERPL: 51 % (ref 20–55)
IRON SERPL-MCNC: 84 UG/DL (ref 59–158)
LYMPHOCYTES NFR BLD: 0.78 K/UL (ref 1.1–3.7)
LYMPHOCYTES RELATIVE PERCENT: 18 % (ref 24–43)
MCH RBC QN AUTO: 29 PG (ref 25.2–33.5)
MCHC RBC AUTO-ENTMCNC: 30.3 G/DL (ref 25.2–33.5)
MCV RBC AUTO: 95.9 FL (ref 82.6–102.9)
MONOCYTES NFR BLD: 0.39 K/UL (ref 0.1–1.2)
MONOCYTES NFR BLD: 9 % (ref 3–12)
NEUTROPHILS NFR BLD: 70 % (ref 36–65)
NEUTS SEG NFR BLD: 3.06 K/UL (ref 1.5–8.1)
NRBC BLD-RTO: 0 PER 100 WBC
PLATELET # BLD AUTO: 194 K/UL (ref 138–453)
PMV BLD AUTO: 10.1 FL (ref 8.1–13.5)
RBC # BLD AUTO: 3.17 M/UL (ref 4.21–5.77)
RBC # BLD: ABNORMAL 10*6/UL
TIBC SERPL-MCNC: 166 UG/DL (ref 250–450)
UNSATURATED IRON BINDING CAPACITY: 82 UG/DL (ref 112–347)
WBC OTHER # BLD: 4.3 K/UL (ref 3.5–11.3)

## 2023-09-26 PROCEDURE — 83550 IRON BINDING TEST: CPT

## 2023-09-26 PROCEDURE — 36415 COLL VENOUS BLD VENIPUNCTURE: CPT

## 2023-09-26 PROCEDURE — 85025 COMPLETE CBC W/AUTO DIFF WBC: CPT

## 2023-09-26 PROCEDURE — 83540 ASSAY OF IRON: CPT

## 2023-10-06 ENCOUNTER — OFFICE VISIT (OUTPATIENT)
Dept: PODIATRY | Age: 78
End: 2023-10-06
Payer: MEDICARE

## 2023-10-06 VITALS — RESPIRATION RATE: 20 BRPM | HEART RATE: 76 BPM | DIASTOLIC BLOOD PRESSURE: 76 MMHG | SYSTOLIC BLOOD PRESSURE: 132 MMHG

## 2023-10-06 DIAGNOSIS — E11.49 DM (DIABETES MELLITUS), TYPE 2 WITH NEUROLOGICAL COMPLICATIONS (HCC): Primary | ICD-10-CM

## 2023-10-06 DIAGNOSIS — B35.1 DERMATOPHYTOSIS OF NAIL: ICD-10-CM

## 2023-10-06 PROCEDURE — 11721 DEBRIDE NAIL 6 OR MORE: CPT | Performed by: PODIATRIST

## 2023-10-14 ENCOUNTER — HOSPITAL ENCOUNTER (OUTPATIENT)
Age: 78
Discharge: HOME | End: 2023-10-14
Payer: MEDICARE

## 2023-10-14 LAB
BASOPHILS # BLD: <0.03 K/UL (ref 0–0.2)
BASOPHILS NFR BLD: 0 % (ref 0–2)
EOSINOPHIL # BLD: 0.09 K/UL (ref 0–0.44)
EOSINOPHILS RELATIVE PERCENT: 2 % (ref 1–4)
ERYTHROCYTE [DISTWIDTH] IN BLOOD BY AUTOMATED COUNT: 15.3 % (ref 11.8–14.4)
HCT VFR BLD AUTO: 32 % (ref 40.7–50.3)
HGB BLD-MCNC: 9.9 G/DL (ref 13–17)
IMM GRANULOCYTES # BLD AUTO: <0.03 K/UL (ref 0–0.3)
IMM GRANULOCYTES NFR BLD: 0 %
LYMPHOCYTES NFR BLD: 0.72 K/UL (ref 1.1–3.7)
LYMPHOCYTES RELATIVE PERCENT: 16 % (ref 24–43)
MCH RBC QN AUTO: 29.5 PG (ref 25.2–33.5)
MCHC RBC AUTO-ENTMCNC: 30.9 G/DL (ref 25.2–33.5)
MCV RBC AUTO: 95.2 FL (ref 82.6–102.9)
MONOCYTES NFR BLD: 0.54 K/UL (ref 0.1–1.2)
MONOCYTES NFR BLD: 12 % (ref 3–12)
NEUTROPHILS NFR BLD: 70 % (ref 36–65)
NEUTS SEG NFR BLD: 3.25 K/UL (ref 1.5–8.1)
NRBC BLD-RTO: 0 PER 100 WBC
PLATELET # BLD AUTO: 207 K/UL (ref 138–453)
PMV BLD AUTO: 9.8 FL (ref 8.1–13.5)
RBC # BLD AUTO: 3.36 M/UL (ref 4.21–5.77)
RBC # BLD: ABNORMAL 10*6/UL
WBC OTHER # BLD: 4.6 K/UL (ref 3.5–11.3)

## 2023-10-14 PROCEDURE — 85025 COMPLETE CBC W/AUTO DIFF WBC: CPT

## 2023-10-14 PROCEDURE — 36415 COLL VENOUS BLD VENIPUNCTURE: CPT

## 2023-10-18 ENCOUNTER — HOSPITAL ENCOUNTER (OUTPATIENT)
Age: 78
Discharge: HOME | End: 2023-10-18
Payer: MEDICARE

## 2023-10-18 LAB
ALBUMIN SERPL-MCNC: 3.5 G/DL (ref 3.5–5.2)
ALBUMIN/GLOB SERPL: 1 {RATIO} (ref 1–2.5)
ALP SERPL-CCNC: 102 U/L (ref 40–129)
ALT SERPL-CCNC: 13 U/L (ref 5–41)
ANION GAP SERPL CALCULATED.3IONS-SCNC: 9 MMOL/L (ref 9–17)
AST SERPL-CCNC: 17 U/L
BASOPHILS # BLD: 0.03 K/UL (ref 0–0.2)
BASOPHILS NFR BLD: 1 % (ref 0–2)
BILIRUB SERPL-MCNC: 0.2 MG/DL (ref 0.3–1.2)
BUN SERPL-MCNC: 35 MG/DL (ref 8–23)
BUN/CREAT SERPL: 22 (ref 9–20)
CALCIUM SERPL-MCNC: 9.2 MG/DL (ref 8.6–10.4)
CHLORIDE SERPL-SCNC: 104 MMOL/L (ref 98–107)
CHOLEST SERPL-MCNC: 113 MG/DL
CHOLESTEROL/HDL RATIO: 2.5
CO2 SERPL-SCNC: 26 MMOL/L (ref 20–31)
CREAT SERPL-MCNC: 1.6 MG/DL (ref 0.7–1.2)
CREAT UR-MCNC: 76.1 MG/DL (ref 39–259)
EOSINOPHIL # BLD: 0.06 K/UL (ref 0–0.44)
EOSINOPHILS RELATIVE PERCENT: 1 % (ref 1–4)
ERYTHROCYTE [DISTWIDTH] IN BLOOD BY AUTOMATED COUNT: 15.1 % (ref 11.8–14.4)
EST. AVERAGE GLUCOSE BLD GHB EST-MCNC: 169 MG/DL
GFR SERPL CREATININE-BSD FRML MDRD: 44 ML/MIN/1.73M2
GLUCOSE SERPL-MCNC: 172 MG/DL (ref 70–99)
HBA1C MFR BLD: 7.5 % (ref 4–6)
HCT VFR BLD AUTO: 31.8 % (ref 40.7–50.3)
HDLC SERPL-MCNC: 45 MG/DL
HGB BLD-MCNC: 9.8 G/DL (ref 13–17)
IMM GRANULOCYTES # BLD AUTO: <0.03 K/UL (ref 0–0.3)
IMM GRANULOCYTES NFR BLD: 0 %
IRON SATN MFR SERPL: 43 % (ref 20–55)
IRON SERPL-MCNC: 77 UG/DL (ref 59–158)
LDLC SERPL CALC-MCNC: 46 MG/DL (ref 0–130)
LYMPHOCYTES NFR BLD: 0.69 K/UL (ref 1.1–3.7)
LYMPHOCYTES RELATIVE PERCENT: 13 % (ref 24–43)
MCH RBC QN AUTO: 29.4 PG (ref 25.2–33.5)
MCHC RBC AUTO-ENTMCNC: 30.8 G/DL (ref 25.2–33.5)
MCV RBC AUTO: 95.5 FL (ref 82.6–102.9)
MICROALBUMIN UR-MCNC: 2838 MG/L
MICROALBUMIN/CREAT UR-RTO: 3729 MCG/MG CREAT
MONOCYTES NFR BLD: 0.47 K/UL (ref 0.1–1.2)
MONOCYTES NFR BLD: 9 % (ref 3–12)
NEUTROPHILS NFR BLD: 76 % (ref 36–65)
NEUTS SEG NFR BLD: 4.04 K/UL (ref 1.5–8.1)
NRBC BLD-RTO: 0 PER 100 WBC
PLATELET # BLD AUTO: 188 K/UL (ref 138–453)
PMV BLD AUTO: 9.6 FL (ref 8.1–13.5)
POTASSIUM SERPL-SCNC: 4.7 MMOL/L (ref 3.7–5.3)
PROT SERPL-MCNC: 7.1 G/DL (ref 6.4–8.3)
RBC # BLD AUTO: 3.33 M/UL (ref 4.21–5.77)
RBC # BLD: ABNORMAL 10*6/UL
SODIUM SERPL-SCNC: 139 MMOL/L (ref 135–144)
T4 FREE SERPL-MCNC: 1 NG/DL (ref 0.9–1.7)
TIBC SERPL-MCNC: 179 UG/DL (ref 250–450)
TRIGL SERPL-MCNC: 109 MG/DL
TSH SERPL DL<=0.05 MIU/L-ACNC: 4.27 UIU/ML (ref 0.3–5)
UNSATURATED IRON BINDING CAPACITY: 102 UG/DL (ref 112–347)
WBC OTHER # BLD: 5.3 K/UL (ref 3.5–11.3)

## 2023-10-18 PROCEDURE — 83036 HEMOGLOBIN GLYCOSYLATED A1C: CPT

## 2023-10-18 PROCEDURE — 82570 ASSAY OF URINE CREATININE: CPT

## 2023-10-18 PROCEDURE — 85025 COMPLETE CBC W/AUTO DIFF WBC: CPT

## 2023-10-18 PROCEDURE — 80053 COMPREHEN METABOLIC PANEL: CPT

## 2023-10-18 PROCEDURE — 84439 ASSAY OF FREE THYROXINE: CPT

## 2023-10-18 PROCEDURE — 84443 ASSAY THYROID STIM HORMONE: CPT

## 2023-10-18 PROCEDURE — 80061 LIPID PANEL: CPT

## 2023-10-18 PROCEDURE — 83540 ASSAY OF IRON: CPT

## 2023-10-18 PROCEDURE — 82043 UR ALBUMIN QUANTITATIVE: CPT

## 2023-10-18 PROCEDURE — 83550 IRON BINDING TEST: CPT

## 2023-10-18 PROCEDURE — 36415 COLL VENOUS BLD VENIPUNCTURE: CPT

## 2023-11-09 ENCOUNTER — HOSPITAL ENCOUNTER (OUTPATIENT)
Age: 78
Discharge: HOME | End: 2023-11-09
Payer: MEDICARE

## 2023-11-09 LAB
BASOPHILS # BLD: <0.03 K/UL (ref 0–0.2)
BASOPHILS NFR BLD: 0 % (ref 0–2)
EOSINOPHIL # BLD: 0.06 K/UL (ref 0–0.44)
EOSINOPHILS RELATIVE PERCENT: 1 % (ref 1–4)
ERYTHROCYTE [DISTWIDTH] IN BLOOD BY AUTOMATED COUNT: 14.5 % (ref 11.8–14.4)
HCT VFR BLD AUTO: 33.9 % (ref 40.7–50.3)
HGB BLD-MCNC: 10.5 G/DL (ref 13–17)
IMM GRANULOCYTES # BLD AUTO: <0.03 K/UL (ref 0–0.3)
IMM GRANULOCYTES NFR BLD: 0 %
LYMPHOCYTES NFR BLD: 0.68 K/UL (ref 1.1–3.7)
LYMPHOCYTES RELATIVE PERCENT: 15 % (ref 24–43)
MCH RBC QN AUTO: 29.7 PG (ref 25.2–33.5)
MCHC RBC AUTO-ENTMCNC: 31 G/DL (ref 25.2–33.5)
MCV RBC AUTO: 95.8 FL (ref 82.6–102.9)
MONOCYTES NFR BLD: 0.38 K/UL (ref 0.1–1.2)
MONOCYTES NFR BLD: 8 % (ref 3–12)
NEUTROPHILS NFR BLD: 76 % (ref 36–65)
NEUTS SEG NFR BLD: 3.51 K/UL (ref 1.5–8.1)
NRBC BLD-RTO: 0 PER 100 WBC
PLATELET # BLD AUTO: 195 K/UL (ref 138–453)
PMV BLD AUTO: 9.9 FL (ref 8.1–13.5)
RBC # BLD AUTO: 3.54 M/UL (ref 4.21–5.77)
RBC # BLD: ABNORMAL 10*6/UL
WBC OTHER # BLD: 4.7 K/UL (ref 3.5–11.3)

## 2023-11-09 PROCEDURE — 85025 COMPLETE CBC W/AUTO DIFF WBC: CPT

## 2023-11-09 PROCEDURE — 36415 COLL VENOUS BLD VENIPUNCTURE: CPT

## 2023-11-13 ENCOUNTER — HOSPITAL ENCOUNTER (OUTPATIENT)
Age: 78
Discharge: HOME | End: 2023-11-13
Payer: MEDICARE

## 2023-11-13 LAB
ALBUMIN SERPL-MCNC: 3.4 G/DL (ref 3.5–5.2)
ALBUMIN/GLOB SERPL: 0.9 {RATIO} (ref 1–2.5)
ALP SERPL-CCNC: 97 U/L (ref 40–129)
ALT SERPL-CCNC: 20 U/L (ref 5–41)
ANION GAP SERPL CALCULATED.3IONS-SCNC: 10 MMOL/L (ref 9–17)
AST SERPL-CCNC: 22 U/L
BASOPHILS # BLD: <0.03 K/UL (ref 0–0.2)
BASOPHILS NFR BLD: 0 % (ref 0–2)
BILIRUB DIRECT SERPL-MCNC: 0.1 MG/DL
BILIRUB INDIRECT SERPL-MCNC: 0.2 MG/DL (ref 0–1)
BILIRUB SERPL-MCNC: 0.3 MG/DL (ref 0.3–1.2)
BNP SERPL-MCNC: 880 PG/ML
BUN SERPL-MCNC: 32 MG/DL (ref 8–23)
BUN/CREAT SERPL: 18 (ref 9–20)
CALCIUM SERPL-MCNC: 9.5 MG/DL (ref 8.6–10.4)
CHLORIDE SERPL-SCNC: 104 MMOL/L (ref 98–107)
CO2 SERPL-SCNC: 27 MMOL/L (ref 20–31)
CREAT SERPL-MCNC: 1.8 MG/DL (ref 0.7–1.2)
CRP SERPL HS-MCNC: 15.5 MG/L (ref 0–5)
EOSINOPHIL # BLD: 0.09 K/UL (ref 0–0.44)
EOSINOPHILS RELATIVE PERCENT: 2 % (ref 1–4)
ERYTHROCYTE [DISTWIDTH] IN BLOOD BY AUTOMATED COUNT: 14.3 % (ref 11.8–14.4)
GFR SERPL CREATININE-BSD FRML MDRD: 38 ML/MIN/1.73M2
GLOBULIN SER CALC-MCNC: 3.6 G/DL (ref 1.5–3.8)
GLUCOSE SERPL-MCNC: 104 MG/DL (ref 70–99)
HCT VFR BLD AUTO: 34.1 % (ref 40.7–50.3)
HGB BLD-MCNC: 10.6 G/DL (ref 13–17)
IMM GRANULOCYTES # BLD AUTO: <0.03 K/UL (ref 0–0.3)
IMM GRANULOCYTES NFR BLD: 0 %
LYMPHOCYTES NFR BLD: 0.75 K/UL (ref 1.1–3.7)
LYMPHOCYTES RELATIVE PERCENT: 16 % (ref 24–43)
MAGNESIUM SERPL-MCNC: 1.7 MG/DL (ref 1.6–2.6)
MCH RBC QN AUTO: 29.6 PG (ref 25.2–33.5)
MCHC RBC AUTO-ENTMCNC: 31.1 G/DL (ref 25.2–33.5)
MCV RBC AUTO: 95.3 FL (ref 82.6–102.9)
MONOCYTES NFR BLD: 0.52 K/UL (ref 0.1–1.2)
MONOCYTES NFR BLD: 11 % (ref 3–12)
NEUTROPHILS NFR BLD: 71 % (ref 36–65)
NEUTS SEG NFR BLD: 3.16 K/UL (ref 1.5–8.1)
NRBC BLD-RTO: 0 PER 100 WBC
PLATELET # BLD AUTO: 212 K/UL (ref 138–453)
PMV BLD AUTO: 9.6 FL (ref 8.1–13.5)
POTASSIUM SERPL-SCNC: 4.8 MMOL/L (ref 3.7–5.3)
PREALB SERPL-MCNC: 23.4 MG/DL (ref 20–40)
PROT SERPL-MCNC: 7 G/DL (ref 6.4–8.3)
RBC # BLD AUTO: 3.58 M/UL (ref 4.21–5.77)
SODIUM SERPL-SCNC: 141 MMOL/L (ref 135–144)
TRANSFERRIN SERPL-MCNC: 133 MG/DL (ref 200–360)
WBC OTHER # BLD: 4.6 K/UL (ref 3.5–11.3)

## 2023-11-13 PROCEDURE — 83880 ASSAY OF NATRIURETIC PEPTIDE: CPT

## 2023-11-13 PROCEDURE — 85025 COMPLETE CBC W/AUTO DIFF WBC: CPT

## 2023-11-13 PROCEDURE — 80048 BASIC METABOLIC PNL TOTAL CA: CPT

## 2023-11-13 PROCEDURE — 86140 C-REACTIVE PROTEIN: CPT

## 2023-11-13 PROCEDURE — 83735 ASSAY OF MAGNESIUM: CPT

## 2023-11-13 PROCEDURE — 84466 ASSAY OF TRANSFERRIN: CPT

## 2023-11-13 PROCEDURE — 80195 ASSAY OF SIROLIMUS: CPT

## 2023-11-13 PROCEDURE — 36415 COLL VENOUS BLD VENIPUNCTURE: CPT

## 2023-11-13 PROCEDURE — 80076 HEPATIC FUNCTION PANEL: CPT

## 2023-11-13 PROCEDURE — 84134 ASSAY OF PREALBUMIN: CPT

## 2023-11-15 LAB — SIROLIMUS BLD-MCNC: 3.6 NG/ML

## 2023-11-21 ENCOUNTER — HOSPITAL ENCOUNTER (OUTPATIENT)
Age: 78
Discharge: HOME | End: 2023-11-21
Payer: MEDICARE

## 2023-11-21 LAB
ANION GAP SERPL CALCULATED.3IONS-SCNC: 12 MMOL/L (ref 9–17)
BNP SERPL-MCNC: 612 PG/ML
BUN SERPL-MCNC: 48 MG/DL (ref 8–23)
BUN/CREAT SERPL: 20 (ref 9–20)
CALCIUM SERPL-MCNC: 9.5 MG/DL (ref 8.6–10.4)
CHLORIDE SERPL-SCNC: 100 MMOL/L (ref 98–107)
CO2 SERPL-SCNC: 28 MMOL/L (ref 20–31)
CREAT SERPL-MCNC: 2.4 MG/DL (ref 0.7–1.2)
GFR SERPL CREATININE-BSD FRML MDRD: 27 ML/MIN/1.73M2
GLUCOSE SERPL-MCNC: 168 MG/DL (ref 70–99)
POTASSIUM SERPL-SCNC: 4.4 MMOL/L (ref 3.7–5.3)
SODIUM SERPL-SCNC: 140 MMOL/L (ref 135–144)

## 2023-11-21 PROCEDURE — 80195 ASSAY OF SIROLIMUS: CPT

## 2023-11-21 PROCEDURE — 83880 ASSAY OF NATRIURETIC PEPTIDE: CPT

## 2023-11-21 PROCEDURE — 80048 BASIC METABOLIC PNL TOTAL CA: CPT

## 2023-11-21 PROCEDURE — 36415 COLL VENOUS BLD VENIPUNCTURE: CPT

## 2023-11-23 LAB — SIROLIMUS BLD-MCNC: 5 NG/ML

## 2023-12-06 ENCOUNTER — HOSPITAL ENCOUNTER (OUTPATIENT)
Age: 78
Discharge: HOME | End: 2023-12-06
Payer: MEDICARE

## 2023-12-06 LAB
FERRITIN SERPL-MCNC: 1275 NG/ML (ref 30–400)
FOLATE SERPL-MCNC: 18.5 NG/ML
VIT B12 SERPL-MCNC: 684 PG/ML (ref 232–1245)

## 2023-12-06 PROCEDURE — 82728 ASSAY OF FERRITIN: CPT

## 2023-12-06 PROCEDURE — 82607 VITAMIN B-12: CPT

## 2023-12-06 PROCEDURE — 82746 ASSAY OF FOLIC ACID SERUM: CPT

## 2023-12-06 PROCEDURE — 36415 COLL VENOUS BLD VENIPUNCTURE: CPT

## 2023-12-06 PROCEDURE — 83540 ASSAY OF IRON: CPT

## 2023-12-06 PROCEDURE — 83550 IRON BINDING TEST: CPT

## 2023-12-07 LAB
IRON SATN MFR SERPL: 54 % (ref 20–55)
IRON SERPL-MCNC: 104 UG/DL (ref 59–158)
TIBC SERPL-MCNC: 191 UG/DL (ref 250–450)
UNSATURATED IRON BINDING CAPACITY: 87 UG/DL (ref 112–347)

## 2023-12-14 ENCOUNTER — OFFICE VISIT (OUTPATIENT)
Dept: PODIATRY | Age: 78
End: 2023-12-14
Payer: MEDICARE

## 2023-12-14 VITALS
BODY MASS INDEX: 31.42 KG/M2 | WEIGHT: 232 LBS | HEIGHT: 72 IN | HEART RATE: 74 BPM | SYSTOLIC BLOOD PRESSURE: 128 MMHG | DIASTOLIC BLOOD PRESSURE: 82 MMHG

## 2023-12-14 DIAGNOSIS — L84 CORNS AND CALLOSITIES: ICD-10-CM

## 2023-12-14 DIAGNOSIS — B35.1 DERMATOPHYTOSIS OF NAIL: ICD-10-CM

## 2023-12-14 DIAGNOSIS — E11.49 DM (DIABETES MELLITUS), TYPE 2 WITH NEUROLOGICAL COMPLICATIONS (HCC): Primary | ICD-10-CM

## 2023-12-14 PROCEDURE — 99999 PR OFFICE/OUTPT VISIT,PROCEDURE ONLY: CPT | Performed by: PODIATRIST

## 2023-12-14 PROCEDURE — 11721 DEBRIDE NAIL 6 OR MORE: CPT | Performed by: PODIATRIST

## 2023-12-14 NOTE — PROGRESS NOTES
Foot Care Worksheet  PCP: Zackery Hickey MD  Last visit: 09 / 12 / 2023 per patient    Nail description: Thick , Yellow , Crumbly , Marked limitation of ambulation     Pain resulting from thickened and dystrophy of nail plate No    Nails involved  Right   1, 2, 3, 4, 5  (T5-T9)  Left     1, 2, 3, 4, 5  (TA-T4)    Q7 1 Class A Finding - Non traumatic amputation of foot No    Q8 2 Class B Findings - Absent DP pulse No, Absent PT pulse No, Advanced tropic changes (3 required) Yes    Decrease hair growth Yes, Nail changes/thickening Yes, Pigmented changes/discoloration Yes, Skin texture (thin, shiny) Yes, Skin color (rubor/redness) No    Q9 1 Class B and 2 Class C Findings  Claudication No, Temperature change Yes, Paresthesia Yes a, Burning No, Edema Yes    Subjective:  Patient presents to [facility] today for routine diabetic foot care. Patient's diabetic control has been not changed. No n/v/f/c/d. Allergies   Allergen Reactions    Bupropion      Other reaction(s): Other See Comments  unknown    Heparin      Other reaction(s): AOF  Other reaction(s): AOF    Menadione      Is able to take the pill form of vitamin K    Other     Phytonadione     Vitamin K     Vitamin K And Related Hives     Is able to take the pill form of vitamin K    Vitamin K1     Diphenhydramine Rash, Other (See Comments) and Hives     Pt had difficulty breathing when he was given benadryl and heparin IV, he has no problems with oral benadryl.   When combined with heparin       Past Medical History:   Diagnosis Date    Anemia     Cancer ([address]     skin    Chronic kidney disease     Diabetes mellitus ([address])     History of total left knee replacement 01/02/2020    [facility]    Hyperlipidemia     Hypertension     Neuropathy     Stroke [facility]) 02/02/2020    Thyroid disease     Type 2 diabetes mellitus without complication ([address] )     UTI (urinary tract infection)        Prior to Admission medications    Medication Sig Start Date End

## 2023-12-26 ENCOUNTER — HOSPITAL ENCOUNTER (OUTPATIENT)
Age: 78
Discharge: HOME | End: 2023-12-26
Payer: MEDICARE

## 2023-12-26 LAB
T4 FREE SERPL-MCNC: 1.3 NG/DL (ref 0.9–1.7)
TSH SERPL DL<=0.05 MIU/L-ACNC: 2.4 UIU/ML (ref 0.3–5)

## 2023-12-26 PROCEDURE — 84439 ASSAY OF FREE THYROXINE: CPT

## 2023-12-26 PROCEDURE — 84443 ASSAY THYROID STIM HORMONE: CPT

## 2023-12-26 PROCEDURE — 36415 COLL VENOUS BLD VENIPUNCTURE: CPT

## 2024-01-03 ENCOUNTER — HOSPITAL ENCOUNTER (OUTPATIENT)
Age: 79
Discharge: HOME | End: 2024-01-03
Payer: MEDICARE

## 2024-01-03 LAB
BASOPHILS # BLD: <0.03 K/UL (ref 0–0.2)
BASOPHILS NFR BLD: 0 % (ref 0–2)
EOSINOPHIL # BLD: 0.05 K/UL (ref 0–0.44)
EOSINOPHILS RELATIVE PERCENT: 1 % (ref 1–4)
ERYTHROCYTE [DISTWIDTH] IN BLOOD BY AUTOMATED COUNT: 13.9 % (ref 11.8–14.4)
HCT VFR BLD AUTO: 35.4 % (ref 40.7–50.3)
HGB BLD-MCNC: 11 G/DL (ref 13–17)
IMM GRANULOCYTES # BLD AUTO: 0.03 K/UL (ref 0–0.3)
IMM GRANULOCYTES NFR BLD: 1 %
LYMPHOCYTES NFR BLD: 0.68 K/UL (ref 1.1–3.7)
LYMPHOCYTES RELATIVE PERCENT: 12 % (ref 24–43)
MCH RBC QN AUTO: 29.2 PG (ref 25.2–33.5)
MCHC RBC AUTO-ENTMCNC: 31.1 G/DL (ref 25.2–33.5)
MCV RBC AUTO: 93.9 FL (ref 82.6–102.9)
MONOCYTES NFR BLD: 0.4 K/UL (ref 0.1–1.2)
MONOCYTES NFR BLD: 7 % (ref 3–12)
NEUTROPHILS NFR BLD: 79 % (ref 36–65)
NEUTS SEG NFR BLD: 4.68 K/UL (ref 1.5–8.1)
NRBC BLD-RTO: 0 PER 100 WBC
PLATELET # BLD AUTO: 196 K/UL (ref 138–453)
PMV BLD AUTO: 9.6 FL (ref 8.1–13.5)
RBC # BLD AUTO: 3.77 M/UL (ref 4.21–5.77)
WBC OTHER # BLD: 5.9 K/UL (ref 3.5–11.3)

## 2024-01-03 PROCEDURE — 83540 ASSAY OF IRON: CPT

## 2024-01-03 PROCEDURE — 83550 IRON BINDING TEST: CPT

## 2024-01-03 PROCEDURE — 85025 COMPLETE CBC W/AUTO DIFF WBC: CPT

## 2024-01-03 PROCEDURE — 36415 COLL VENOUS BLD VENIPUNCTURE: CPT

## 2024-01-04 LAB
IRON SATN MFR SERPL: 41 % (ref 20–55)
IRON SERPL-MCNC: 68 UG/DL (ref 59–158)
TIBC SERPL-MCNC: 165 UG/DL (ref 250–450)
UNSATURATED IRON BINDING CAPACITY: 97 UG/DL (ref 112–347)

## 2024-02-02 ENCOUNTER — HOSPITAL ENCOUNTER (OUTPATIENT)
Age: 79
Discharge: HOME | End: 2024-02-02
Payer: MEDICARE

## 2024-02-02 LAB
ANION GAP SERPL CALCULATED.3IONS-SCNC: 11 MMOL/L (ref 9–17)
BASOPHILS # BLD: 0.04 K/UL (ref 0–0.2)
BASOPHILS NFR BLD: 1 % (ref 0–2)
BNP SERPL-MCNC: 1064 PG/ML
BUN SERPL-MCNC: 23 MG/DL (ref 8–23)
BUN/CREAT SERPL: 16 (ref 9–20)
CALCIUM SERPL-MCNC: 9.4 MG/DL (ref 8.6–10.4)
CHLORIDE SERPL-SCNC: 107 MMOL/L (ref 98–107)
CO2 SERPL-SCNC: 25 MMOL/L (ref 20–31)
CREAT SERPL-MCNC: 1.4 MG/DL (ref 0.7–1.2)
EOSINOPHIL # BLD: 0.06 K/UL (ref 0–0.44)
EOSINOPHILS RELATIVE PERCENT: 1 % (ref 1–4)
ERYTHROCYTE [DISTWIDTH] IN BLOOD BY AUTOMATED COUNT: 14.4 % (ref 11.8–14.4)
GFR SERPL CREATININE-BSD FRML MDRD: 51 ML/MIN/1.73M2
GLUCOSE SERPL-MCNC: 131 MG/DL (ref 70–99)
HCT VFR BLD AUTO: 36.5 % (ref 40.7–50.3)
HGB BLD-MCNC: 11.2 G/DL (ref 13–17)
IMM GRANULOCYTES # BLD AUTO: 0.03 K/UL (ref 0–0.3)
IMM GRANULOCYTES NFR BLD: 1 %
LYMPHOCYTES NFR BLD: 0.72 K/UL (ref 1.1–3.7)
LYMPHOCYTES RELATIVE PERCENT: 14 % (ref 24–43)
MAGNESIUM SERPL-MCNC: 1.9 MG/DL (ref 1.6–2.6)
MCH RBC QN AUTO: 28.7 PG (ref 25.2–33.5)
MCHC RBC AUTO-ENTMCNC: 30.7 G/DL (ref 25.2–33.5)
MCV RBC AUTO: 93.6 FL (ref 82.6–102.9)
MONOCYTES NFR BLD: 0.41 K/UL (ref 0.1–1.2)
MONOCYTES NFR BLD: 8 % (ref 3–12)
NEUTROPHILS NFR BLD: 75 % (ref 36–65)
NEUTS SEG NFR BLD: 3.78 K/UL (ref 1.5–8.1)
NRBC BLD-RTO: 0 PER 100 WBC
PLATELET # BLD AUTO: 181 K/UL (ref 138–453)
PMV BLD AUTO: 9.6 FL (ref 8.1–13.5)
POTASSIUM SERPL-SCNC: 4.6 MMOL/L (ref 3.7–5.3)
RBC # BLD AUTO: 3.9 M/UL (ref 4.21–5.77)
SODIUM SERPL-SCNC: 143 MMOL/L (ref 135–144)
WBC OTHER # BLD: 5 K/UL (ref 3.5–11.3)

## 2024-02-02 PROCEDURE — 83735 ASSAY OF MAGNESIUM: CPT

## 2024-02-02 PROCEDURE — 36415 COLL VENOUS BLD VENIPUNCTURE: CPT

## 2024-02-02 PROCEDURE — 80048 BASIC METABOLIC PNL TOTAL CA: CPT

## 2024-02-02 PROCEDURE — 83880 ASSAY OF NATRIURETIC PEPTIDE: CPT

## 2024-02-02 PROCEDURE — 80195 ASSAY OF SIROLIMUS: CPT

## 2024-02-02 PROCEDURE — 85025 COMPLETE CBC W/AUTO DIFF WBC: CPT

## 2024-02-05 LAB — SIROLIMUS BLD-MCNC: 6.6 NG/ML

## 2024-02-22 ENCOUNTER — OFFICE VISIT (OUTPATIENT)
Dept: PODIATRY | Age: 79
End: 2024-02-22
Payer: MEDICARE

## 2024-02-22 VITALS
SYSTOLIC BLOOD PRESSURE: 130 MMHG | DIASTOLIC BLOOD PRESSURE: 64 MMHG | WEIGHT: 249.8 LBS | BODY MASS INDEX: 33.88 KG/M2 | RESPIRATION RATE: 20 BRPM | HEART RATE: 72 BPM

## 2024-02-22 DIAGNOSIS — L84 CORNS AND CALLOSITIES: ICD-10-CM

## 2024-02-22 DIAGNOSIS — E11.49 DM (DIABETES MELLITUS), TYPE 2 WITH NEUROLOGICAL COMPLICATIONS (HCC): Primary | ICD-10-CM

## 2024-02-22 DIAGNOSIS — B35.1 DERMATOPHYTOSIS OF NAIL: ICD-10-CM

## 2024-02-22 PROCEDURE — 11721 DEBRIDE NAIL 6 OR MORE: CPT | Performed by: PODIATRIST

## 2024-02-22 NOTE — PROGRESS NOTES
MD Mai   Vitamin D (CHOLECALCIFEROL) 1000 UNITS CAPS capsule Take 1 capsule by mouth daily   Yes Mai Whitaker MD   sirolimus (RAPAMUNE) 1 MG tablet Take 2 tablets by mouth daily   Yes Mai Whitaker MD   MYCOPHENOLATE MOFETIL PO Take  by mouth. Indications: 1000mg - BID   Yes Mai Whitaker MD   levothyroxine (SYNTHROID) 50 MCG tablet Take 1 tablet by mouth Daily   Yes Mai Whitaker MD   diphenoxylate-atropine (LOMOTIL) 2.5-0.025 MG per tablet Take 1 tablet by mouth 4 times daily as needed for Diarrhea.   Yes Mai Whitaker MD   amLODIPine (NORVASC) 5 MG tablet Take 1 tablet by mouth daily   Yes Mai Whitaker MD   furosemide (LASIX) 20 MG tablet Take 1 tablet by mouth daily   Yes Mai Whitaker MD   CALCIUM POLYCARBOPHIL PO Take 500 mg by mouth daily.   Yes Mai Whitaker MD   atorvastatin (LIPITOR) 40 MG tablet Take 1 tablet by mouth daily   Yes Mai Whitaker MD   HYDROcodone-acetaminophen (NORCO) 5-325 MG per tablet Take 1 tablet by mouth every 6 hours as needed for Pain (1 or 2 tabs).   Yes Mai Whitaker MD   Omega-3 Fatty Acids (FISH OIL PEARLS PO) Take by mouth 2 times daily   Yes Mai Whitkaer MD   Pantoprazole Sodium (PROTONIX) 40 MG PACK packet Take 1 packet by mouth daily   Yes Mai Whitaker MD   insulin regular (HUMULIN R;NOVOLIN R) 100 UNIT/ML injection Inject into the skin See Admin Instructions Indications: sliding scale   Yes Mai Whitaker MD   insulin NPH (HUMULIN N;NOVOLIN N) 100 UNIT/ML injection vial Inject into the skin 2 times daily (before meals) Indications: 60units in the am and 50units in the pm   Yes Mai Whitaker MD       Social History     Tobacco Use    Smoking status: Never    Smokeless tobacco: Never   Substance Use Topics    Alcohol use: No     Review of Systems: All 12 systems reviewed and pertinent positives noted above.  Objective:  Vascular: DP and PT pulses palpable

## 2024-02-28 ENCOUNTER — HOSPITAL ENCOUNTER (OUTPATIENT)
Age: 79
Discharge: HOME | End: 2024-02-28
Payer: MEDICARE

## 2024-02-28 LAB
25(OH)D3 SERPL-MCNC: 22.2 NG/ML
ALBUMIN SERPL-MCNC: 3.4 G/DL (ref 3.5–5.2)
ALBUMIN/GLOB SERPL: 1 {RATIO} (ref 1–2.5)
ALP SERPL-CCNC: 83 U/L (ref 40–129)
ALT SERPL-CCNC: 19 U/L (ref 5–41)
ANION GAP SERPL CALCULATED.3IONS-SCNC: 10 MMOL/L (ref 9–17)
AST SERPL-CCNC: 19 U/L
BACTERIA URNS QL MICRO: ABNORMAL
BASOPHILS # BLD: <0.03 K/UL (ref 0–0.2)
BASOPHILS NFR BLD: 0 % (ref 0–2)
BILIRUB SERPL-MCNC: 0.2 MG/DL (ref 0.3–1.2)
BILIRUB UR QL STRIP: NEGATIVE
BUN SERPL-MCNC: 36 MG/DL (ref 8–23)
BUN/CREAT SERPL: 23 (ref 9–20)
CALCIUM SERPL-MCNC: 9.3 MG/DL (ref 8.6–10.4)
CHARACTER UR: ABNORMAL
CHLORIDE SERPL-SCNC: 111 MMOL/L (ref 98–107)
CK SERPL-CCNC: 98 U/L (ref 39–308)
CLARITY UR: CLEAR
CO2 SERPL-SCNC: 20 MMOL/L (ref 20–31)
COLOR UR: YELLOW
CREAT SERPL-MCNC: 1.6 MG/DL (ref 0.7–1.2)
CREAT UR-MCNC: 73.8 MG/DL (ref 39–259)
EOSINOPHIL # BLD: 0.07 K/UL (ref 0–0.44)
EOSINOPHILS RELATIVE PERCENT: 1 % (ref 1–4)
EPI CELLS #/AREA URNS HPF: ABNORMAL /HPF (ref 0–5)
ERYTHROCYTE [DISTWIDTH] IN BLOOD BY AUTOMATED COUNT: 14.1 % (ref 11.8–14.4)
EST. AVERAGE GLUCOSE BLD GHB EST-MCNC: 148 MG/DL
GFR SERPL CREATININE-BSD FRML MDRD: 44 ML/MIN/1.73M2
GLUCOSE SERPL-MCNC: 161 MG/DL (ref 70–99)
GLUCOSE UR STRIP-MCNC: ABNORMAL MG/DL
HBA1C MFR BLD: 6.8 % (ref 4–6)
HCT VFR BLD AUTO: 34.8 % (ref 40.7–50.3)
HGB BLD-MCNC: 10.8 G/DL (ref 13–17)
HGB UR QL STRIP.AUTO: ABNORMAL
IMM GRANULOCYTES # BLD AUTO: <0.03 K/UL (ref 0–0.3)
IMM GRANULOCYTES NFR BLD: 0 %
KETONES UR STRIP-MCNC: NEGATIVE MG/DL
LEUKOCYTE ESTERASE UR QL STRIP: NEGATIVE
LYMPHOCYTES NFR BLD: 0.73 K/UL (ref 1.1–3.7)
LYMPHOCYTES RELATIVE PERCENT: 13 % (ref 24–43)
MAGNESIUM SERPL-MCNC: 2 MG/DL (ref 1.6–2.6)
MCH RBC QN AUTO: 29.4 PG (ref 25.2–33.5)
MCHC RBC AUTO-ENTMCNC: 31 G/DL (ref 25.2–33.5)
MCV RBC AUTO: 94.8 FL (ref 82.6–102.9)
MONOCYTES NFR BLD: 0.4 K/UL (ref 0.1–1.2)
MONOCYTES NFR BLD: 7 % (ref 3–12)
NEUTROPHILS NFR BLD: 79 % (ref 36–65)
NEUTS SEG NFR BLD: 4.49 K/UL (ref 1.5–8.1)
NITRITE UR QL STRIP: NEGATIVE
NRBC BLD-RTO: 0 PER 100 WBC
PH UR STRIP: 6 [PH] (ref 5–6)
PHOSPHATE SERPL-MCNC: 3.3 MG/DL (ref 2.5–4.5)
PLATELET # BLD AUTO: 141 K/UL (ref 138–453)
PMV BLD AUTO: 10.9 FL (ref 8.1–13.5)
POTASSIUM SERPL-SCNC: 5.3 MMOL/L (ref 3.7–5.3)
PROT SERPL-MCNC: 6.7 G/DL (ref 6.4–8.3)
PROT UR STRIP-MCNC: ABNORMAL MG/DL
PTH-INTACT SERPL-MCNC: 24.8 PG/ML (ref 14–72)
RBC # BLD AUTO: 3.67 M/UL (ref 4.21–5.77)
RBC #/AREA URNS HPF: ABNORMAL /HPF (ref 0–4)
SODIUM SERPL-SCNC: 141 MMOL/L (ref 135–144)
SP GR UR STRIP: 1.02 (ref 1.01–1.02)
TOTAL PROTEIN, URINE: 164 MG/DL
URATE SERPL-MCNC: 6.3 MG/DL (ref 3.4–7)
URINE TOTAL PROTEIN CREATININE RATIO: 2.22 (ref 0–0.2)
UROBILINOGEN UR STRIP-ACNC: NORMAL EU/DL (ref 0–1)
WBC #/AREA URNS HPF: ABNORMAL /HPF (ref 0–4)
WBC OTHER # BLD: 5.7 K/UL (ref 3.5–11.3)

## 2024-02-28 PROCEDURE — 83036 HEMOGLOBIN GLYCOSYLATED A1C: CPT

## 2024-02-28 PROCEDURE — 84550 ASSAY OF BLOOD/URIC ACID: CPT

## 2024-02-28 PROCEDURE — 36415 COLL VENOUS BLD VENIPUNCTURE: CPT

## 2024-02-28 PROCEDURE — 83970 ASSAY OF PARATHORMONE: CPT

## 2024-02-28 PROCEDURE — 82550 ASSAY OF CK (CPK): CPT

## 2024-02-28 PROCEDURE — 84156 ASSAY OF PROTEIN URINE: CPT

## 2024-02-28 PROCEDURE — 85025 COMPLETE CBC W/AUTO DIFF WBC: CPT

## 2024-02-28 PROCEDURE — 83735 ASSAY OF MAGNESIUM: CPT

## 2024-02-28 PROCEDURE — 81001 URINALYSIS AUTO W/SCOPE: CPT

## 2024-02-28 PROCEDURE — 80053 COMPREHEN METABOLIC PANEL: CPT

## 2024-02-28 PROCEDURE — 80195 ASSAY OF SIROLIMUS: CPT

## 2024-02-28 PROCEDURE — 82306 VITAMIN D 25 HYDROXY: CPT

## 2024-02-28 PROCEDURE — 84100 ASSAY OF PHOSPHORUS: CPT

## 2024-02-28 PROCEDURE — 82570 ASSAY OF URINE CREATININE: CPT

## 2024-03-01 LAB — SIROLIMUS BLD-MCNC: 3.2 NG/ML

## 2024-05-14 ENCOUNTER — OFFICE VISIT (OUTPATIENT)
Dept: PODIATRY | Age: 79
End: 2024-05-14
Payer: MEDICARE

## 2024-05-14 VITALS — HEART RATE: 72 BPM | DIASTOLIC BLOOD PRESSURE: 70 MMHG | SYSTOLIC BLOOD PRESSURE: 132 MMHG | RESPIRATION RATE: 24 BRPM

## 2024-05-14 DIAGNOSIS — B35.1 DERMATOPHYTOSIS OF NAIL: ICD-10-CM

## 2024-05-14 DIAGNOSIS — L84 CORNS AND CALLOSITIES: ICD-10-CM

## 2024-05-14 DIAGNOSIS — E11.49 DM (DIABETES MELLITUS), TYPE 2 WITH NEUROLOGICAL COMPLICATIONS (HCC): Primary | ICD-10-CM

## 2024-05-14 PROCEDURE — 99999 PR OFFICE/OUTPT VISIT,PROCEDURE ONLY: CPT | Performed by: PODIATRIST

## 2024-05-14 PROCEDURE — 11721 DEBRIDE NAIL 6 OR MORE: CPT | Performed by: PODIATRIST

## 2024-05-14 NOTE — PROGRESS NOTES
CFT <3 seconds, bilateral.  Hair growth present to the level of the digits, bilateral.  Edema present, bilateral.  Varicosities present, bilateral. Erythema absent, bilateral. Distal Rubor absent bilateral.  Temperature within normal limits bilateral. Hyperpigmentation present bilateral. Atrophic skin yes.    Neurological: Sensation intact to light touch to level of digits, bilateral.  Protective sensation intact 10/10 sites via 5.07/10g Ettrick-Loy Monofilament, bilateral.  negative Tinel's, bilateral.  negative Valleix sign, bilateral.  Vibratory intact bilateral.  Reflexes Decreased bilateral.  Paresthesias positive.  Dysthesias negative.  Sharp/dull intact bilateral.    Musculoskeletal: Muscle strength 5/5, bilateral.  Pain absent upon palpation bilateral. Normal medial longitudinal arch, bilateral.  Ankle ROM decresed,bilateral.  1st MPJ ROM within normal limits, bilateral.  Dorsally contracted digits absent. No other foot deformities.    Integument: Open lesion absent, Bilateral.  Interdigital maceration absent to web spaces,absent Bilateral.  Nails left 1, 2, 3, 4, 5 and right 1, 2, 3, 4, 5 thickened, dystrophic and crumbly, discolored with subungual debris.  Fissures absent, Bilateral. Hyperkeratotic tissue is absent. Seen dorsal R hallux ipj.    Assessment:    Diagnosis Orders   1. DM (diabetes mellitus), type 2 with neurological complications (HCC)        2. Dermatophytosis of nail        3. Corns and callosities              Plan:  Diabetic foot education and exam.  All nails as mentioned above debrided in length and thickness.  Patient advised OTC methods for treatment of the mycotic nails.  Patient will follow up in 10 weeks.

## 2024-06-05 ENCOUNTER — HOSPITAL ENCOUNTER (OUTPATIENT)
Age: 79
Discharge: HOME | End: 2024-06-05
Payer: MEDICARE

## 2024-06-05 LAB
25(OH)D3 SERPL-MCNC: 28.8 NG/ML (ref 30–100)
ALBUMIN SERPL-MCNC: 4.3 G/DL (ref 3.5–5.2)
ALBUMIN/GLOB SERPL: 1.2 {RATIO} (ref 1–2.5)
ALP SERPL-CCNC: 107 U/L (ref 40–129)
ALT SERPL-CCNC: 27 U/L (ref 5–41)
ANION GAP SERPL CALCULATED.3IONS-SCNC: 11 MMOL/L (ref 9–17)
AST SERPL-CCNC: 23 U/L
BACTERIA URNS QL MICRO: ABNORMAL
BASOPHILS # BLD: 0.04 K/UL (ref 0–0.2)
BASOPHILS NFR BLD: 1 % (ref 0–2)
BILIRUB SERPL-MCNC: 0.3 MG/DL (ref 0.3–1.2)
BILIRUB UR QL STRIP: NEGATIVE
BUN SERPL-MCNC: 60 MG/DL (ref 8–23)
BUN/CREAT SERPL: 25 (ref 9–20)
CA-I BLD-SCNC: 1.37 MMOL/L (ref 1.13–1.33)
CALCIUM SERPL-MCNC: 9.6 MG/DL (ref 8.6–10.4)
CHARACTER UR: ABNORMAL
CHLORIDE SERPL-SCNC: 110 MMOL/L (ref 98–107)
CK SERPL-CCNC: 94 U/L (ref 39–308)
CLARITY UR: CLEAR
CO2 SERPL-SCNC: 16 MMOL/L (ref 20–31)
COLOR UR: YELLOW
CREAT SERPL-MCNC: 2.4 MG/DL (ref 0.7–1.2)
CREAT UR-MCNC: 84.1 MG/DL (ref 39–259)
EOSINOPHIL # BLD: 0.12 K/UL (ref 0–0.44)
EOSINOPHILS RELATIVE PERCENT: 2 % (ref 1–4)
EPI CELLS #/AREA URNS HPF: ABNORMAL /HPF (ref 0–5)
ERYTHROCYTE [DISTWIDTH] IN BLOOD BY AUTOMATED COUNT: 14.7 % (ref 11.8–14.4)
EST. AVERAGE GLUCOSE BLD GHB EST-MCNC: 166 MG/DL
GFR, ESTIMATED: 27 ML/MIN/1.73M2
GLUCOSE SERPL-MCNC: 136 MG/DL (ref 70–99)
GLUCOSE UR STRIP-MCNC: NEGATIVE MG/DL
HBA1C MFR BLD: 7.4 % (ref 4–6)
HCT VFR BLD AUTO: 32.7 % (ref 40.7–50.3)
HGB BLD-MCNC: 10.1 G/DL (ref 13–17)
HGB UR QL STRIP.AUTO: NEGATIVE
IMM GRANULOCYTES # BLD AUTO: 0.03 K/UL (ref 0–0.3)
IMM GRANULOCYTES NFR BLD: 1 %
KETONES UR STRIP-MCNC: NEGATIVE MG/DL
LEUKOCYTE ESTERASE UR QL STRIP: NEGATIVE
LYMPHOCYTES NFR BLD: 0.81 K/UL (ref 1.1–3.7)
LYMPHOCYTES RELATIVE PERCENT: 15 % (ref 24–43)
MAGNESIUM SERPL-MCNC: 2 MG/DL (ref 1.6–2.6)
MCH RBC QN AUTO: 29.1 PG (ref 25.2–33.5)
MCHC RBC AUTO-ENTMCNC: 30.9 G/DL (ref 25.2–33.5)
MCV RBC AUTO: 94.2 FL (ref 82.6–102.9)
MONOCYTES NFR BLD: 0.41 K/UL (ref 0.1–1.2)
MONOCYTES NFR BLD: 8 % (ref 3–12)
NEUTROPHILS NFR BLD: 73 % (ref 36–65)
NEUTS SEG NFR BLD: 3.93 K/UL (ref 1.5–8.1)
NITRITE UR QL STRIP: NEGATIVE
NRBC BLD-RTO: 0 PER 100 WBC
PH UR STRIP: 6 [PH] (ref 5–6)
PHOSPHATE SERPL-MCNC: 4.2 MG/DL (ref 2.5–4.5)
PLATELET # BLD AUTO: 174 K/UL (ref 138–453)
PMV BLD AUTO: 10 FL (ref 8.1–13.5)
POTASSIUM SERPL-SCNC: 5.7 MMOL/L (ref 3.7–5.3)
PROT SERPL-MCNC: 7.9 G/DL (ref 6.4–8.3)
PROT UR STRIP-MCNC: ABNORMAL MG/DL
PTH-INTACT SERPL-MCNC: 38 PG/ML (ref 15–65)
RBC # BLD AUTO: 3.47 M/UL (ref 4.21–5.77)
RBC # BLD: ABNORMAL 10*6/UL
RBC #/AREA URNS HPF: ABNORMAL /HPF (ref 0–4)
SODIUM SERPL-SCNC: 137 MMOL/L (ref 135–144)
SP GR UR STRIP: 1.02 (ref 1.01–1.02)
TOTAL PROTEIN, URINE: 83 MG/DL
URATE SERPL-MCNC: 8.4 MG/DL (ref 3.4–7)
URINE TOTAL PROTEIN CREATININE RATIO: 0.99 (ref 0–0.2)
UROBILINOGEN UR STRIP-ACNC: NORMAL EU/DL (ref 0–1)
WBC #/AREA URNS HPF: ABNORMAL /HPF (ref 0–4)
WBC OTHER # BLD: 5.3 K/UL (ref 3.5–11.3)

## 2024-06-05 PROCEDURE — 82330 ASSAY OF CALCIUM: CPT

## 2024-06-05 PROCEDURE — 81001 URINALYSIS AUTO W/SCOPE: CPT

## 2024-06-05 PROCEDURE — 80195 ASSAY OF SIROLIMUS: CPT

## 2024-06-05 PROCEDURE — 84100 ASSAY OF PHOSPHORUS: CPT

## 2024-06-05 PROCEDURE — 82550 ASSAY OF CK (CPK): CPT

## 2024-06-05 PROCEDURE — 83540 ASSAY OF IRON: CPT

## 2024-06-05 PROCEDURE — 82570 ASSAY OF URINE CREATININE: CPT

## 2024-06-05 PROCEDURE — 85025 COMPLETE CBC W/AUTO DIFF WBC: CPT

## 2024-06-05 PROCEDURE — 83036 HEMOGLOBIN GLYCOSYLATED A1C: CPT

## 2024-06-05 PROCEDURE — 83735 ASSAY OF MAGNESIUM: CPT

## 2024-06-05 PROCEDURE — 80053 COMPREHEN METABOLIC PANEL: CPT

## 2024-06-05 PROCEDURE — 84550 ASSAY OF BLOOD/URIC ACID: CPT

## 2024-06-05 PROCEDURE — 83550 IRON BINDING TEST: CPT

## 2024-06-05 PROCEDURE — 84156 ASSAY OF PROTEIN URINE: CPT

## 2024-06-05 PROCEDURE — 83970 ASSAY OF PARATHORMONE: CPT

## 2024-06-05 PROCEDURE — 82306 VITAMIN D 25 HYDROXY: CPT

## 2024-06-05 PROCEDURE — 36415 COLL VENOUS BLD VENIPUNCTURE: CPT

## 2024-06-06 LAB
IRON SATN MFR SERPL: 49 % (ref 20–55)
IRON SERPL-MCNC: 101 UG/DL (ref 61–157)
TIBC SERPL-MCNC: 205 UG/DL (ref 250–450)
UNSATURATED IRON BINDING CAPACITY: 104 UG/DL (ref 112–347)

## 2024-06-07 LAB — SIROLIMUS BLD-MCNC: 4.8 NG/ML

## 2024-06-15 ENCOUNTER — HOSPITAL ENCOUNTER (OUTPATIENT)
Age: 79
Discharge: HOME | End: 2024-06-15
Payer: MEDICARE

## 2024-06-15 LAB
25(OH)D3 SERPL-MCNC: 25.7 NG/ML (ref 30–100)
ALBUMIN SERPL-MCNC: 4 G/DL (ref 3.5–5.2)
ALBUMIN/GLOB SERPL: 1.3 {RATIO} (ref 1–2.5)
ALP SERPL-CCNC: 107 U/L (ref 40–129)
ALT SERPL-CCNC: 14 U/L (ref 5–41)
ANION GAP SERPL CALCULATED.3IONS-SCNC: 11 MMOL/L (ref 9–17)
AST SERPL-CCNC: 15 U/L
BILIRUB SERPL-MCNC: 0.2 MG/DL (ref 0.3–1.2)
BUN SERPL-MCNC: 86 MG/DL (ref 8–23)
BUN/CREAT SERPL: 27 (ref 9–20)
CALCIUM SERPL-MCNC: 8.9 MG/DL (ref 8.6–10.4)
CHLORIDE SERPL-SCNC: 114 MMOL/L (ref 98–107)
CHOLEST SERPL-MCNC: 127 MG/DL (ref 0–199)
CHOLESTEROL/HDL RATIO: 5
CO2 SERPL-SCNC: 11 MMOL/L (ref 20–31)
CREAT SERPL-MCNC: 3.2 MG/DL (ref 0.7–1.2)
CREAT UR-MCNC: 165 MG/DL (ref 39–259)
EST. AVERAGE GLUCOSE BLD GHB EST-MCNC: 163 MG/DL
GFR, ESTIMATED: 19 ML/MIN/1.73M2
GLUCOSE SERPL-MCNC: 168 MG/DL (ref 70–99)
HBA1C MFR BLD: 7.3 % (ref 4–6)
HDLC SERPL-MCNC: 27 MG/DL
LDLC SERPL CALC-MCNC: 32 MG/DL (ref 0–100)
MICROALBUMIN UR-MCNC: 194 MG/L (ref 0–20)
MICROALBUMIN/CREAT UR-RTO: 118 MCG/MG CREAT (ref 0–17)
POTASSIUM SERPL-SCNC: 6.5 MMOL/L (ref 3.7–5.3)
PROT SERPL-MCNC: 7.2 G/DL (ref 6.4–8.3)
SODIUM SERPL-SCNC: 136 MMOL/L (ref 135–144)
T4 FREE SERPL-MCNC: 1 NG/DL (ref 0.92–1.68)
TRIGL SERPL-MCNC: 338 MG/DL
TSH SERPL DL<=0.05 MIU/L-ACNC: 5.61 UIU/ML (ref 0.3–5)
VIT B12 SERPL-MCNC: 678 PG/ML (ref 232–1245)
VLDLC SERPL CALC-MCNC: 68 MG/DL

## 2024-06-15 PROCEDURE — 82043 UR ALBUMIN QUANTITATIVE: CPT

## 2024-06-15 PROCEDURE — 84443 ASSAY THYROID STIM HORMONE: CPT

## 2024-06-15 PROCEDURE — 82570 ASSAY OF URINE CREATININE: CPT

## 2024-06-15 PROCEDURE — 80053 COMPREHEN METABOLIC PANEL: CPT

## 2024-06-15 PROCEDURE — 83036 HEMOGLOBIN GLYCOSYLATED A1C: CPT

## 2024-06-15 PROCEDURE — 84439 ASSAY OF FREE THYROXINE: CPT

## 2024-06-15 PROCEDURE — 82306 VITAMIN D 25 HYDROXY: CPT

## 2024-06-15 PROCEDURE — 80061 LIPID PANEL: CPT

## 2024-06-15 PROCEDURE — 82607 VITAMIN B-12: CPT

## 2024-06-15 PROCEDURE — 36415 COLL VENOUS BLD VENIPUNCTURE: CPT

## 2024-08-24 ENCOUNTER — HOSPITAL ENCOUNTER (OUTPATIENT)
Age: 79
Discharge: HOME | End: 2024-08-24
Payer: MEDICARE

## 2024-08-24 LAB
ANION GAP SERPL CALCULATED.3IONS-SCNC: 12 MMOL/L (ref 9–17)
BASOPHILS # BLD: <0.03 K/UL (ref 0–0.2)
BASOPHILS NFR BLD: 1 % (ref 0–2)
BNP SERPL-MCNC: 1507 PG/ML
BUN SERPL-MCNC: 27 MG/DL (ref 8–23)
BUN/CREAT SERPL: 15 (ref 9–20)
CALCIUM SERPL-MCNC: 9.2 MG/DL (ref 8.6–10.4)
CHLORIDE SERPL-SCNC: 107 MMOL/L (ref 98–107)
CO2 SERPL-SCNC: 22 MMOL/L (ref 20–31)
CREAT SERPL-MCNC: 1.8 MG/DL (ref 0.7–1.2)
EOSINOPHIL # BLD: 0.1 K/UL (ref 0–0.44)
EOSINOPHILS RELATIVE PERCENT: 3 % (ref 1–4)
ERYTHROCYTE [DISTWIDTH] IN BLOOD BY AUTOMATED COUNT: 13.3 % (ref 11.8–14.4)
GFR, ESTIMATED: 38 ML/MIN/1.73M2
GLUCOSE SERPL-MCNC: 142 MG/DL (ref 70–99)
HCT VFR BLD AUTO: 34.6 % (ref 40.7–50.3)
HGB BLD-MCNC: 11 G/DL (ref 13–17)
IMM GRANULOCYTES # BLD AUTO: <0.03 K/UL (ref 0–0.3)
IMM GRANULOCYTES NFR BLD: 0 %
LYMPHOCYTES NFR BLD: 0.75 K/UL (ref 1.1–3.7)
LYMPHOCYTES RELATIVE PERCENT: 21 % (ref 24–43)
MAGNESIUM SERPL-MCNC: 2.1 MG/DL (ref 1.6–2.6)
MCH RBC QN AUTO: 30.6 PG (ref 25.2–33.5)
MCHC RBC AUTO-ENTMCNC: 31.8 G/DL (ref 25.2–33.5)
MCV RBC AUTO: 96.1 FL (ref 82.6–102.9)
MONOCYTES NFR BLD: 0.37 K/UL (ref 0.1–1.2)
MONOCYTES NFR BLD: 10 % (ref 3–12)
NEUTROPHILS NFR BLD: 65 % (ref 36–65)
NEUTS SEG NFR BLD: 2.32 K/UL (ref 1.5–8.1)
NRBC BLD-RTO: 0 PER 100 WBC
PLATELET # BLD AUTO: 178 K/UL (ref 138–453)
PMV BLD AUTO: 10.1 FL (ref 8.1–13.5)
POTASSIUM SERPL-SCNC: 4.9 MMOL/L (ref 3.7–5.3)
RBC # BLD AUTO: 3.6 M/UL (ref 4.21–5.77)
SODIUM SERPL-SCNC: 141 MMOL/L (ref 135–144)
WBC OTHER # BLD: 3.6 K/UL (ref 3.5–11.3)

## 2024-08-24 PROCEDURE — 83880 ASSAY OF NATRIURETIC PEPTIDE: CPT

## 2024-08-24 PROCEDURE — 80195 ASSAY OF SIROLIMUS: CPT

## 2024-08-24 PROCEDURE — 80048 BASIC METABOLIC PNL TOTAL CA: CPT

## 2024-08-24 PROCEDURE — 85025 COMPLETE CBC W/AUTO DIFF WBC: CPT

## 2024-08-24 PROCEDURE — 36415 COLL VENOUS BLD VENIPUNCTURE: CPT

## 2024-08-24 PROCEDURE — 83735 ASSAY OF MAGNESIUM: CPT

## 2024-08-26 LAB — SIROLIMUS BLD-MCNC: 7.2 NG/ML

## 2024-09-25 ENCOUNTER — HOSPITAL ENCOUNTER (OUTPATIENT)
Age: 79
Discharge: HOME | End: 2024-09-25
Payer: MEDICARE

## 2024-09-25 LAB
25(OH)D3 SERPL-MCNC: 29.4 NG/ML (ref 30–100)
ALBUMIN SERPL-MCNC: 3.6 G/DL (ref 3.5–5.2)
ALBUMIN/GLOB SERPL: 1 {RATIO} (ref 1–2.5)
ALP SERPL-CCNC: 95 U/L (ref 40–129)
ALT SERPL-CCNC: 12 U/L (ref 5–41)
ANION GAP SERPL CALCULATED.3IONS-SCNC: 8 MMOL/L (ref 9–17)
AST SERPL-CCNC: 22 U/L
BACTERIA URNS QL MICRO: ABNORMAL
BASOPHILS # BLD: <0.03 K/UL (ref 0–0.2)
BASOPHILS NFR BLD: 0 % (ref 0–2)
BILIRUB SERPL-MCNC: 0.2 MG/DL (ref 0.3–1.2)
BILIRUB UR QL STRIP: NEGATIVE
BUN SERPL-MCNC: 28 MG/DL (ref 8–23)
BUN/CREAT SERPL: 16 (ref 9–20)
CALCIUM SERPL-MCNC: 9.4 MG/DL (ref 8.6–10.4)
CHARACTER UR: ABNORMAL
CHLORIDE SERPL-SCNC: 107 MMOL/L (ref 98–107)
CK SERPL-CCNC: 117 U/L (ref 39–308)
CLARITY UR: CLEAR
CO2 SERPL-SCNC: 26 MMOL/L (ref 20–31)
COLOR UR: YELLOW
CREAT SERPL-MCNC: 1.7 MG/DL (ref 0.7–1.2)
CREAT UR-MCNC: 78.4 MG/DL (ref 39–259)
EOSINOPHIL # BLD: 0.08 K/UL (ref 0–0.44)
EOSINOPHILS RELATIVE PERCENT: 2 % (ref 1–4)
EPI CELLS #/AREA URNS HPF: ABNORMAL /HPF (ref 0–5)
ERYTHROCYTE [DISTWIDTH] IN BLOOD BY AUTOMATED COUNT: 13.4 % (ref 11.8–14.4)
EST. AVERAGE GLUCOSE BLD GHB EST-MCNC: 163 MG/DL
GFR, ESTIMATED: 41 ML/MIN/1.73M2
GLUCOSE SERPL-MCNC: 116 MG/DL (ref 70–99)
GLUCOSE UR STRIP-MCNC: NEGATIVE MG/DL
HBA1C MFR BLD: 7.3 % (ref 4–6)
HCT VFR BLD AUTO: 34.4 % (ref 40.7–50.3)
HGB BLD-MCNC: 11 G/DL (ref 13–17)
HGB UR QL STRIP.AUTO: ABNORMAL
IMM GRANULOCYTES # BLD AUTO: <0.03 K/UL (ref 0–0.3)
IMM GRANULOCYTES NFR BLD: 0 %
IRON SATN MFR SERPL: 32 % (ref 20–55)
IRON SERPL-MCNC: 55 UG/DL (ref 61–157)
KETONES UR STRIP-MCNC: NEGATIVE MG/DL
LEUKOCYTE ESTERASE UR QL STRIP: NEGATIVE
LYMPHOCYTES NFR BLD: 0.66 K/UL (ref 1.1–3.7)
LYMPHOCYTES RELATIVE PERCENT: 15 % (ref 24–43)
MAGNESIUM SERPL-MCNC: 1.9 MG/DL (ref 1.6–2.6)
MCH RBC QN AUTO: 29.6 PG (ref 25.2–33.5)
MCHC RBC AUTO-ENTMCNC: 32 G/DL (ref 25.2–33.5)
MCV RBC AUTO: 92.5 FL (ref 82.6–102.9)
MONOCYTES NFR BLD: 0.43 K/UL (ref 0.1–1.2)
MONOCYTES NFR BLD: 10 % (ref 3–12)
NEUTROPHILS NFR BLD: 73 % (ref 36–65)
NEUTS SEG NFR BLD: 3.3 K/UL (ref 1.5–8.1)
NITRITE UR QL STRIP: NEGATIVE
NRBC BLD-RTO: 0 PER 100 WBC
PH UR STRIP: 6 [PH] (ref 5–6)
PHOSPHATE SERPL-MCNC: 3.7 MG/DL (ref 2.5–4.5)
PLATELET # BLD AUTO: 172 K/UL (ref 138–453)
PMV BLD AUTO: 10.1 FL (ref 8.1–13.5)
POTASSIUM SERPL-SCNC: 4.5 MMOL/L (ref 3.7–5.3)
PROT SERPL-MCNC: 7.1 G/DL (ref 6.4–8.3)
PROT UR STRIP-MCNC: ABNORMAL MG/DL
PTH-INTACT SERPL-MCNC: 30 PG/ML (ref 15–65)
RBC # BLD AUTO: 3.72 M/UL (ref 4.21–5.77)
RBC #/AREA URNS HPF: ABNORMAL /HPF (ref 0–4)
SODIUM SERPL-SCNC: 141 MMOL/L (ref 135–144)
SP GR UR STRIP: 1.02 (ref 1.01–1.02)
TIBC SERPL-MCNC: 174 UG/DL (ref 250–450)
TOTAL PROTEIN, URINE: 514 MG/DL
UNSATURATED IRON BINDING CAPACITY: 119 UG/DL (ref 112–347)
URATE SERPL-MCNC: 5.7 MG/DL (ref 3.4–7)
URINE TOTAL PROTEIN CREATININE RATIO: 6.56 (ref 0–0.2)
UROBILINOGEN UR STRIP-ACNC: NORMAL EU/DL (ref 0–1)
WBC #/AREA URNS HPF: ABNORMAL /HPF (ref 0–4)
WBC OTHER # BLD: 4.5 K/UL (ref 3.5–11.3)

## 2024-09-25 PROCEDURE — 83735 ASSAY OF MAGNESIUM: CPT

## 2024-09-25 PROCEDURE — 83970 ASSAY OF PARATHORMONE: CPT

## 2024-09-25 PROCEDURE — 82570 ASSAY OF URINE CREATININE: CPT

## 2024-09-25 PROCEDURE — 83036 HEMOGLOBIN GLYCOSYLATED A1C: CPT

## 2024-09-25 PROCEDURE — 83550 IRON BINDING TEST: CPT

## 2024-09-25 PROCEDURE — 80053 COMPREHEN METABOLIC PANEL: CPT

## 2024-09-25 PROCEDURE — 84156 ASSAY OF PROTEIN URINE: CPT

## 2024-09-25 PROCEDURE — 84550 ASSAY OF BLOOD/URIC ACID: CPT

## 2024-09-25 PROCEDURE — 84100 ASSAY OF PHOSPHORUS: CPT

## 2024-09-25 PROCEDURE — 82550 ASSAY OF CK (CPK): CPT

## 2024-09-25 PROCEDURE — 36415 COLL VENOUS BLD VENIPUNCTURE: CPT

## 2024-09-25 PROCEDURE — 82306 VITAMIN D 25 HYDROXY: CPT

## 2024-09-25 PROCEDURE — 80195 ASSAY OF SIROLIMUS: CPT

## 2024-09-25 PROCEDURE — 83540 ASSAY OF IRON: CPT

## 2024-09-25 PROCEDURE — 85025 COMPLETE CBC W/AUTO DIFF WBC: CPT

## 2024-09-25 PROCEDURE — 81001 URINALYSIS AUTO W/SCOPE: CPT

## 2024-09-27 LAB — SIROLIMUS BLD-MCNC: 12.1 NG/ML

## 2024-10-16 ENCOUNTER — OFFICE VISIT (OUTPATIENT)
Dept: PODIATRY | Age: 79
End: 2024-10-16
Payer: MEDICARE

## 2024-10-16 VITALS
WEIGHT: 267.4 LBS | HEART RATE: 76 BPM | SYSTOLIC BLOOD PRESSURE: 132 MMHG | RESPIRATION RATE: 20 BRPM | BODY MASS INDEX: 36.27 KG/M2 | DIASTOLIC BLOOD PRESSURE: 74 MMHG

## 2024-10-16 DIAGNOSIS — E11.49 DM (DIABETES MELLITUS), TYPE 2 WITH NEUROLOGICAL COMPLICATIONS (HCC): Primary | ICD-10-CM

## 2024-10-16 DIAGNOSIS — B35.1 DERMATOPHYTOSIS OF NAIL: ICD-10-CM

## 2024-10-16 PROCEDURE — 11721 DEBRIDE NAIL 6 OR MORE: CPT | Performed by: PODIATRIST

## 2024-10-16 PROCEDURE — 99999 PR OFFICE/OUTPT VISIT,PROCEDURE ONLY: CPT | Performed by: PODIATRIST

## 2024-10-16 NOTE — PROGRESS NOTES
Authorizing Provider   Cholecalciferol 50 MCG (2000 UT) CAPS Take 1 capsule by mouth daily    Mai Whitaker MD   Multiple Vitamin (MULTI-VITAMIN PO) Take by mouth daily    Mai Whitaker MD   FERROUS SULFATE PO Take 5 g by mouth 2 times daily    Mai Whitaker MD   insulin glargine (LANTUS;BASAGLAR) 100 UNIT/ML injection pen Inject 70 Units into the skin daily    Mai Whitaker MD   mirabegron (MYRBETRIQ) 50 MG TB24 Take 50 mg by mouth daily 2/17/21   Mai Whitaker MD   insulin lispro (HUMALOG) 100 UNIT/ML SOLN injection vial Inject into the skin 3 times daily (before meals)    Mai Whitaker MD   Evolocumab (REPATHA SURECLICK) 140 MG/ML SOAJ INJECT 1 PEN SUBCUTANEOUSLY EVERY TWO WEEKS 9/16/21   Mai Whitaker MD   Diabetic Shoe MISC by Does not apply route Dispense dm shoe and insoles  DM (diabetes mellitus), type 2 with neurological complications (HCC)  (primary encounter diagnosis)  Dermatophytosis of nail  Equinus deformity of both feet  Hammer toe, unspecified laterality 12/2/20   Alexander Egan DPCHRIS   cyanocobalamin 100 MCG tablet Take 1 tablet by mouth daily    Mai Whitaker MD   aspirin 325 MG tablet Take 1 tablet by mouth daily    Mai Whitaker MD   cephALEXin (KEFLEX) 500 MG capsule  12/20/18   Mai Whitaker MD   tacrolimus (PROGRAF) 1 MG capsule  12/4/18   Mai Whitaker MD   doxycycline hyclate (VIBRAMYCIN) 100 MG capsule Take 1 capsule by mouth 2 times daily    Mai Whitaker MD   tamsulosin (FLOMAX) 0.4 MG capsule Take 1 capsule by mouth daily    Mai Whitaker MD   gabapentin (NEURONTIN) 100 MG capsule Take 1 capsule by mouth 3 times daily.    Mai Whitaker MD   Compression Stockings MISC by Does not apply route    Mai Whitaker MD   calcitRIOL (ROCALTROL) 0.25 MCG capsule Take 1 capsule by mouth daily    Mai Whitaker MD   Vitamin D (CHOLECALCIFEROL) 1000 UNITS CAPS capsule Take

## 2024-11-13 ENCOUNTER — HOSPITAL ENCOUNTER (OUTPATIENT)
Age: 79
Setting detail: SPECIMEN
Discharge: HOME | End: 2024-11-13

## 2024-11-13 LAB
ANION GAP SERPL CALCULATED.3IONS-SCNC: 9 MMOL/L (ref 9–17)
BASOPHILS # BLD: <0.03 K/UL (ref 0–0.2)
BASOPHILS NFR BLD: 0 % (ref 0–2)
BNP SERPL-MCNC: 1924 PG/ML
BUN SERPL-MCNC: 44 MG/DL (ref 8–23)
BUN SERPL-MCNC: 44 MG/DL (ref 8–23)
BUN/CREAT SERPL: 29 (ref 9–20)
CALCIUM SERPL-MCNC: 9 MG/DL (ref 8.6–10.4)
CHLORIDE SERPL-SCNC: 102 MMOL/L (ref 98–107)
CO2 SERPL-SCNC: 29 MMOL/L (ref 20–31)
CREAT SERPL-MCNC: 1.5 MG/DL (ref 0.7–1.2)
CREAT SERPL-MCNC: 1.5 MG/DL (ref 0.7–1.2)
EOSINOPHIL # BLD: 0.05 K/UL (ref 0–0.44)
EOSINOPHILS RELATIVE PERCENT: 1 % (ref 1–4)
ERYTHROCYTE [DISTWIDTH] IN BLOOD BY AUTOMATED COUNT: 14.8 % (ref 11.8–14.4)
GFR, ESTIMATED: 47 ML/MIN/1.73M2
GFR, ESTIMATED: 47 ML/MIN/1.73M2
GLUCOSE SERPL-MCNC: 75 MG/DL (ref 70–99)
HCT VFR BLD AUTO: 30 % (ref 40.7–50.3)
HGB BLD-MCNC: 9.7 G/DL (ref 13–17)
IMM GRANULOCYTES # BLD AUTO: 0.06 K/UL (ref 0–0.3)
IMM GRANULOCYTES NFR BLD: 1 %
LYMPHOCYTES NFR BLD: 0.77 K/UL (ref 1.1–3.7)
LYMPHOCYTES RELATIVE PERCENT: 12 % (ref 24–43)
MAGNESIUM SERPL-MCNC: 2 MG/DL (ref 1.6–2.6)
MCH RBC QN AUTO: 29.7 PG (ref 25.2–33.5)
MCHC RBC AUTO-ENTMCNC: 32.3 G/DL (ref 25.2–33.5)
MCV RBC AUTO: 91.7 FL (ref 82.6–102.9)
MONOCYTES NFR BLD: 0.6 K/UL (ref 0.1–1.2)
MONOCYTES NFR BLD: 10 % (ref 3–12)
NEUTROPHILS NFR BLD: 76 % (ref 36–65)
NEUTS SEG NFR BLD: 4.82 K/UL (ref 1.5–8.1)
NRBC BLD-RTO: 0 PER 100 WBC
PLATELET # BLD AUTO: 117 K/UL (ref 138–453)
PMV BLD AUTO: 11.1 FL (ref 8.1–13.5)
POTASSIUM SERPL-SCNC: 4.8 MMOL/L (ref 3.7–5.3)
RBC # BLD AUTO: 3.27 M/UL (ref 4.21–5.77)
RBC # BLD: ABNORMAL 10*6/UL
SODIUM SERPL-SCNC: 140 MMOL/L (ref 135–144)
WBC OTHER # BLD: 6.3 K/UL (ref 3.5–11.3)

## 2024-11-13 PROCEDURE — 85025 COMPLETE CBC W/AUTO DIFF WBC: CPT

## 2024-11-13 PROCEDURE — 80048 BASIC METABOLIC PNL TOTAL CA: CPT

## 2024-11-13 PROCEDURE — 80195 ASSAY OF SIROLIMUS: CPT

## 2024-11-13 PROCEDURE — 83880 ASSAY OF NATRIURETIC PEPTIDE: CPT

## 2024-11-13 PROCEDURE — 83735 ASSAY OF MAGNESIUM: CPT

## 2024-11-15 LAB — SIROLIMUS BLD-MCNC: 4.8 NG/ML

## 2024-11-18 ENCOUNTER — OUTSIDE SERVICES (OUTPATIENT)
Dept: INTERNAL MEDICINE | Age: 79
End: 2024-11-18

## 2024-11-18 DIAGNOSIS — E11.69 TYPE 2 DIABETES MELLITUS WITH OTHER SPECIFIED COMPLICATION, WITH LONG-TERM CURRENT USE OF INSULIN (HCC): ICD-10-CM

## 2024-11-18 DIAGNOSIS — I10 PRIMARY HYPERTENSION: ICD-10-CM

## 2024-11-18 DIAGNOSIS — Z79.4 TYPE 2 DIABETES MELLITUS WITH OTHER SPECIFIED COMPLICATION, WITH LONG-TERM CURRENT USE OF INSULIN (HCC): ICD-10-CM

## 2024-11-18 DIAGNOSIS — J18.9 PNEUMONIA DUE TO INFECTIOUS ORGANISM, UNSPECIFIED LATERALITY, UNSPECIFIED PART OF LUNG: Primary | ICD-10-CM

## 2024-11-18 DIAGNOSIS — R53.1 GENERAL WEAKNESS: ICD-10-CM

## 2024-11-18 DIAGNOSIS — B37.0 THRUSH: ICD-10-CM

## 2024-11-18 DIAGNOSIS — E03.9 HYPOTHYROIDISM, UNSPECIFIED TYPE: ICD-10-CM

## 2024-11-18 NOTE — PROGRESS NOTES
11/18/24  Jose D Maldonado Jr.  1945    Patient Resident of [facility]    Chief Complaint  1. Pneumonia due to infectious organism, unspecified laterality, unspecified part of lung    2. General weakness    3. Primary hypertension    4. Hypothyroidism, unspecified type    5. Type 2 diabetes mellitus with other specified complication, with long-term current use of insulin ([facility])    6. Thrush        HPI:  79-year-old patient with recent hospitalization at [facility] for pneumonia.  Was to be on IV antibiotics as completed yesterday.  Patient states breathing improving.  Staff asking to pull midline.  He has been afebrile.  Pulse ox has been stable.  Does use his CPAP at night.  Plan will be to be at [facility] to gain strength and then back home.  He does have sores in his mouth.  They do have him on clotrimazole lozenges.  He is to be getting them 5 times a day however patient states only getting 1 or 2 a day.  He continues on Lantus and sliding scale for his diabetes mellitus.  Blood sugars have been stable.  Vitals stable.  Continues on Zetia and Repatha for hyperlipidemia.  Mood has been stable on Remeron.  Nursing staff deny any acute nursing service issues other than removal of the midline    Allergies   Allergen Reactions    Bupropion      Other reaction(s): Other See Comments  unknown    Heparin      Other reaction(s): AOF  Other reaction(s): AOF    Menadione      Is able to take the pill form of vitamin K    Other     Phytonadione     Vitamin K     Vitamin K And Related Hives     Is able to take the pill form of vitamin K    Vitamin K1     Diphenhydramine Rash, Other (See Comments) and Hives     Pt had difficulty breathing when he was given benadryl and heparin IV, he has no problems with oral benadryl.  When combined with heparin       Past Medical History:   Diagnosis Date    Anemia     Cancer (HCC)     skin    Chronic kidney disease     Diabetes mellitus (HCC)     History of total left knee

## 2024-11-19 ENCOUNTER — TELEPHONE (OUTPATIENT)
Dept: INTERNAL MEDICINE | Age: 79
End: 2024-11-19

## 2024-11-19 NOTE — TELEPHONE ENCOUNTER
Brandi lopez [facility] called to let us know that patient has a fever, chills, sob, sore throat, and that she will probably have to send him out. She stated that he just finished antibiotics. I let her know that we needed to send a fax to our office.

## 2024-11-21 ASSESSMENT — ENCOUNTER SYMPTOMS: COUGH: 1

## 2024-11-27 ENCOUNTER — HOSPITAL ENCOUNTER (OUTPATIENT)
Age: 79
Setting detail: SPECIMEN
Discharge: HOME | End: 2024-11-27

## 2024-11-27 LAB
ANION GAP SERPL CALCULATED.3IONS-SCNC: 11 MMOL/L (ref 9–17)
BASOPHILS # BLD: <0.03 K/UL (ref 0–0.2)
BASOPHILS NFR BLD: 0 % (ref 0–2)
BUN SERPL-MCNC: 67 MG/DL (ref 8–23)
BUN/CREAT SERPL: 35 (ref 9–20)
CALCIUM SERPL-MCNC: 8.5 MG/DL (ref 8.6–10.4)
CHLORIDE SERPL-SCNC: 102 MMOL/L (ref 98–107)
CO2 SERPL-SCNC: 27 MMOL/L (ref 20–31)
CREAT SERPL-MCNC: 1.9 MG/DL (ref 0.7–1.2)
EOSINOPHIL # BLD: <0.03 K/UL (ref 0–0.44)
EOSINOPHILS RELATIVE PERCENT: 1 % (ref 1–4)
ERYTHROCYTE [DISTWIDTH] IN BLOOD BY AUTOMATED COUNT: 14.2 % (ref 11.8–14.4)
GFR, ESTIMATED: 35 ML/MIN/1.73M2
GLUCOSE SERPL-MCNC: 129 MG/DL (ref 70–99)
HCT VFR BLD AUTO: 26.5 % (ref 40.7–50.3)
HGB BLD-MCNC: 8.4 G/DL (ref 13–17)
IMM GRANULOCYTES # BLD AUTO: 0.06 K/UL (ref 0–0.3)
IMM GRANULOCYTES NFR BLD: 2 %
LYMPHOCYTES NFR BLD: 0.6 K/UL (ref 1.1–3.7)
LYMPHOCYTES RELATIVE PERCENT: 17 % (ref 24–43)
MCH RBC QN AUTO: 29.3 PG (ref 25.2–33.5)
MCHC RBC AUTO-ENTMCNC: 31.7 G/DL (ref 25.2–33.5)
MCV RBC AUTO: 92.3 FL (ref 82.6–102.9)
MONOCYTES NFR BLD: 0.51 K/UL (ref 0.1–1.2)
MONOCYTES NFR BLD: 14 % (ref 3–12)
NEUTROPHILS NFR BLD: 66 % (ref 36–65)
NEUTS SEG NFR BLD: 2.36 K/UL (ref 1.5–8.1)
NRBC BLD-RTO: 0.6 PER 100 WBC
PLATELET # BLD AUTO: 194 K/UL (ref 138–453)
PMV BLD AUTO: 10.2 FL (ref 8.1–13.5)
POTASSIUM SERPL-SCNC: 4.3 MMOL/L (ref 3.7–5.3)
RBC # BLD AUTO: 2.87 M/UL (ref 4.21–5.77)
SODIUM SERPL-SCNC: 140 MMOL/L (ref 135–144)
WBC OTHER # BLD: 3.6 K/UL (ref 3.5–11.3)

## 2024-11-27 PROCEDURE — 80048 BASIC METABOLIC PNL TOTAL CA: CPT

## 2024-11-27 PROCEDURE — 85025 COMPLETE CBC W/AUTO DIFF WBC: CPT

## 2024-11-27 PROCEDURE — 36415 COLL VENOUS BLD VENIPUNCTURE: CPT

## 2024-12-20 DIAGNOSIS — J18.9 PNEUMONIA DUE TO INFECTIOUS ORGANISM, UNSPECIFIED LATERALITY, UNSPECIFIED PART OF LUNG: ICD-10-CM

## 2024-12-20 DIAGNOSIS — J44.1 COPD EXACERBATION (HCC): Primary | ICD-10-CM

## 2024-12-20 RX ORDER — FOLIC ACID 1 MG/1
1 TABLET ORAL DAILY
Qty: 30 TABLET | Refills: 0 | Status: SHIPPED | OUTPATIENT
Start: 2024-12-20

## 2024-12-20 RX ORDER — ATOVAQUONE 750 MG/5ML
750 SUSPENSION ORAL DAILY
Qty: 150 ML | Refills: 0 | Status: SHIPPED | OUTPATIENT
Start: 2024-12-20 | End: 2025-01-19

## 2024-12-20 RX ORDER — ALBUTEROL SULFATE 0.83 MG/ML
2.5 SOLUTION RESPIRATORY (INHALATION) 4 TIMES DAILY PRN
Qty: 120 EACH | Refills: 0 | Status: SHIPPED | OUTPATIENT
Start: 2024-12-20

## 2024-12-20 RX ORDER — ALBUTEROL SULFATE 0.83 MG/ML
2.5 SOLUTION RESPIRATORY (INHALATION) 4 TIMES DAILY PRN
Qty: 120 EACH | Refills: 0 | Status: SHIPPED | OUTPATIENT
Start: 2024-12-20 | End: 2024-12-20 | Stop reason: SDUPTHER

## 2024-12-20 NOTE — TELEPHONE ENCOUNTER
Salty requesting a refill of the below medication which has been pended for you for discharge to home.    Requested Prescriptions     Pending Prescriptions Disp Refills    albuterol (PROVENTIL) (2.5 MG/3ML) 0.083% nebulizer solution 120 each 0     Sig: Take 3 mLs by nebulization 4 times daily as needed for Wheezing or Shortness of Breath    atovaquone (MEPRON) 750 MG/5ML suspension 150 mL 0     Sig: Take 5 mLs by mouth daily    diclofenac sodium (VOLTAREN) 1 %  g 0     Sig: Apply 4 g topically 4 times daily    folic acid (FOLVITE) 1 MG tablet 30 tablet 0     Sig: Take 1 tablet by mouth daily         Allergies   Allergen Reactions    Bupropion      Other reaction(s): Other See Comments  unknown    Heparin      Other reaction(s): AOF  Other reaction(s): AOF    Menadione      Is able to take the pill form of vitamin K    Other     Phytonadione     Vitamin K     Vitamin K And Related Hives     Is able to take the pill form of vitamin K    Vitamin K1     Diphenhydramine Rash, Other (See Comments) and Hives     Pt had difficulty breathing when he was given benadryl and heparin IV, he has no problems with oral benadryl.  When combined with heparin